# Patient Record
Sex: FEMALE | Race: WHITE | NOT HISPANIC OR LATINO | Employment: OTHER | URBAN - NONMETROPOLITAN AREA
[De-identification: names, ages, dates, MRNs, and addresses within clinical notes are randomized per-mention and may not be internally consistent; named-entity substitution may affect disease eponyms.]

---

## 2017-10-23 ENCOUNTER — HISTORY (OUTPATIENT)
Dept: FAMILY MEDICINE | Facility: OTHER | Age: 77
End: 2017-10-23

## 2017-10-23 ENCOUNTER — OFFICE VISIT - GICH (OUTPATIENT)
Dept: FAMILY MEDICINE | Facility: OTHER | Age: 77
End: 2017-10-23

## 2017-10-23 DIAGNOSIS — E78.5 HYPERLIPIDEMIA: ICD-10-CM

## 2017-10-23 DIAGNOSIS — L30.9 DERMATITIS: ICD-10-CM

## 2017-10-23 DIAGNOSIS — I10 ESSENTIAL (PRIMARY) HYPERTENSION: ICD-10-CM

## 2017-10-23 DIAGNOSIS — R73.9 HYPERGLYCEMIA: ICD-10-CM

## 2017-10-23 DIAGNOSIS — Z23 ENCOUNTER FOR IMMUNIZATION: ICD-10-CM

## 2017-10-23 LAB
A/G RATIO - HISTORICAL: 1.3 (ref 1–2)
ALB RAND URINE - HISTORICAL: 8 MG/L
ALBUMIN SERPL-MCNC: 4.2 G/DL (ref 3.5–5.7)
ALP SERPL-CCNC: 55 IU/L (ref 34–104)
ALT (SGPT) - HISTORICAL: 16 IU/L (ref 7–52)
ANION GAP - HISTORICAL: 10 (ref 5–18)
AST SERPL-CCNC: 17 IU/L (ref 13–39)
BILIRUB SERPL-MCNC: 0.6 MG/DL (ref 0.3–1)
BUN SERPL-MCNC: 15 MG/DL (ref 7–25)
BUN/CREAT RATIO - HISTORICAL: 16
CALCIUM SERPL-MCNC: 9.8 MG/DL (ref 8.6–10.3)
CHLORIDE SERPLBLD-SCNC: 102 MMOL/L (ref 98–107)
CHOL/HDL RATIO - HISTORICAL: 3.13
CHOLESTEROL TOTAL: 166 MG/DL
CO2 SERPL-SCNC: 26 MMOL/L (ref 21–31)
CREAT SERPL-MCNC: 0.96 MG/DL (ref 0.7–1.3)
CREATININE, URINE - HISTORICAL: 1.21 G/L
ESTIMATED AVERAGE GLUCOSE: 126 MG/DL
GFR IF NOT AFRICAN AMERICAN - HISTORICAL: 57 ML/MIN/1.73M2
GLOBULIN - HISTORICAL: 3.3 G/DL (ref 2–3.7)
GLUCOSE SERPL-MCNC: 127 MG/DL (ref 70–105)
HDLC SERPL-MCNC: 53 MG/DL (ref 23–92)
HEMOGLOBIN A1C MONITORING (POCT) - HISTORICAL: 6 % (ref 4–6.2)
LDLC SERPL CALC-MCNC: 77 MG/DL
MICROALBUMIN, RAND UR - HISTORICAL: 6.6 MG/G CREAT
NON-HDL CHOLESTEROL - HISTORICAL: 113 MG/DL
POTASSIUM SERPL-SCNC: 3.9 MMOL/L (ref 3.5–5.1)
PROT SERPL-MCNC: 7.5 G/DL (ref 6.4–8.9)
PROVIDER ORDERDED STATUS - HISTORICAL: ABNORMAL
SODIUM SERPL-SCNC: 138 MMOL/L (ref 133–143)
TRIGL SERPL-MCNC: 180 MG/DL

## 2017-12-28 NOTE — PROGRESS NOTES
Patient Information     Patient Name MRN Sex     Ankit Lew [id number] Female 1940      Progress Notes signed by Yelena Cotton MD at 10/23/2017  4:36 PM      Author:  Yelena Cotton MD Service:  (none) Author Type:  Physician     Filed:  10/23/2017  4:36 PM Encounter Date:  10/23/2017 Status:  Signed     :  Yelena Cotton MD (Physician)            2017      ANKIT LEW  [address] [place] [address]      RE: ANKIT LEW  MR#: [id number]  : 1940        Dear Ms. Gibbons:    The results of your lab work is enclosed. As you can see, it is all essentially normal.  Your glucose or blood sugar does run a bit high; however, the A1c test for diabetes is normal, so your average has been just fine and there is no need to do any further testing at this time.  We should just plan to repeat lab work again in a year.    Your cholesterol and your blood profile otherwise looked fine and again should be just done again in a year.  Medications can remain the same.    Sincerely yours,      YELENA COTTON MD    WR/nn  D:10/23/2017 15:44:18  T:10/23/2017 16:09:46  VJID: [id number]  TJID: [id number]    cc:  Enclosure(s):10/23/2017, lipid panel, metabolic panel, A1c

## 2017-12-28 NOTE — PROGRESS NOTES
Patient Information     Patient Name MRN Oanh Azul [id number] Female 1940      Progress Notes by Dickson Almanzar MD at 10/23/2017  9:15 AM     Author:  Dickson Almanzar MD Service:  (none) Author Type:  Physician     Filed:  10/23/2017 11:33 AM Encounter Date:  10/23/2017 Status:  Signed     :  Dickson Almanzar MD (Physician)            HPI-Comes in today for comprehensive evaluation.  She's been feeling fine. She needs medications refilled. She said she's basically well and has no problems or concerns.    ROS:  See HPI.  Weight is stable. No fever, chills, or night sweats. Eyes, ENT, cardiopulmonary, GI, , rheumatologic, hematologic, skin, lymph, neurologic, psychiatric and endocrine are all negative.    Past Medical History:     Diagnosis  Date     CATARACTS      inactive icd-9 diagnosis auto replaced with icd-10, display name retained//mporz      History of delivery     Child birth x 3      Hyperlipidemia      Hypertension      Osteoarthritis      Past Surgical History:      Procedure  Laterality Date     Bilateral tubal ligation      Bilateral tubal ligation       COLONOSCOPY SCREENING  10/24/2013    diverticulosis-no further colonoscopy rec d/t age       Family History       Problem   Relation Age of Onset     Cancer  Mother       lung cancer       Stroke  Father 70     Cancer-breast  No Family History      Social History     Substance Use Topics       Smoking status: Never Smoker     Smokeless tobacco: Never Used     Alcohol use Yes     No Known Allergies    Current Outpatient Prescriptions:      aspirin 81 mg tablet, Take 81 mg by mouth once daily with a meal., Disp: , Rfl:      hydroCHLOROthiazide (HCTZ) 25 mg tablet, Take 1 tablet by mouth once daily., Disp: 90 tablet, Rfl: 4     MULTIVITAMINS WITH IRON (MULTI-VITAMIN WITH IRON ORAL), Take 1 Tab by mouth., Disp: , Rfl:      simvastatin (ZOCOR) 10 mg tablet, Take 1 tablet by mouth at bedtime., Disp: 90 tablet,  Rfl: 4     triamcinolone (ARISTOCORT) 0.1 % ointment, Apply  topically to affected area(s) 3 times daily., Disp: 80 g, Rfl: 3  Medications have been reviewed by me and are current to the best of my knowledge and ability.      EXAM:she is a pleasant healthy-appearing 77 y.o. female in no apparent distress.  She answers questions appropriately and appears well-kempt.  HEENT exam is within normal limits.  .  Neck is supple with good carotid pulses.  No bruits are heard.  No thyromegaly or adenopathy.  Lungs are clear with good air movement.  No rales, rhonchi, or wheezes are heard--  Cardiac exam reveals a regular rhythm with no murmur or gallop appreciated.  Peripheral pulses are equal and strong  Abdomen-soft and nontender with no hepatosplenomegaly or masses.  Bowel sounds are normal and no bruits are heard  Back is straight with no scoliosis or CVA tenderness.      Extremities no edema. There are a few minor varicose veins. Good pulses.  Neurologic exam is intact and nonfocal  Skin is free of significant lesions.  No rashes are seen  Psychiatric: Alert and oriented with normal mood and affect    Lab-pending  Imaging-mammogram was normal last year. Patient prefers to be on a 2 year schedule so this will be due next year.    Assessment-preventive healthcare-immunizations are up-to-date.flu vaccine was given today.    History of eczema-refilled triamcinolone    Hypertension with good control    History of hyperglycemia with slightly elevated A1c-labs pending    Hyperlipidemia-labs pending    Plan-meds refilled. Labs done, she'll be notified of the result. She is leaving in 2 days for [place] for the winter. We'll follow-up again in the spring pending lab results otherwise at yearly intervals.    Dickson Almanzar MD ....................  10/23/2017   11:30 AM

## 2017-12-30 NOTE — NURSING NOTE
Patient Information     Patient Name MRN Sex Oanh Robison [id number] Female 1940      Nursing Note by Nimisha Martinez at 10/23/2017  9:15 AM     Author:  Nimisha Martinez Service:  (none) Author Type:  (none)     Filed:  10/23/2017  9:31 AM Encounter Date:  10/23/2017 Status:  Signed     :  Nimisha Martinez            Patient presents to clinic for medication management  Nimisha Iyer ....................  10/23/2017   9:08 AM

## 2018-01-27 VITALS
SYSTOLIC BLOOD PRESSURE: 118 MMHG | WEIGHT: 154.8 LBS | BODY MASS INDEX: 26.57 KG/M2 | DIASTOLIC BLOOD PRESSURE: 66 MMHG | HEART RATE: 64 BPM

## 2018-02-01 ENCOUNTER — DOCUMENTATION ONLY (OUTPATIENT)
Dept: FAMILY MEDICINE | Facility: OTHER | Age: 78
End: 2018-02-01

## 2018-02-01 PROBLEM — I10 HYPERTENSION: Status: ACTIVE | Noted: 2018-02-01

## 2018-02-01 PROBLEM — E78.5 HYPERLIPIDEMIA: Status: ACTIVE | Noted: 2018-02-01

## 2018-02-01 RX ORDER — TRIAMCINOLONE ACETONIDE 1 MG/G
OINTMENT TOPICAL
COMMUNITY
Start: 2017-10-23 | End: 2020-10-06

## 2018-02-01 RX ORDER — HYDROCHLOROTHIAZIDE 25 MG/1
25 TABLET ORAL DAILY
COMMUNITY
Start: 2017-10-23 | End: 2018-10-11

## 2018-02-01 RX ORDER — SIMVASTATIN 10 MG
10 TABLET ORAL AT BEDTIME
COMMUNITY
Start: 2017-10-23 | End: 2018-10-11

## 2018-09-10 ENCOUNTER — TELEPHONE (OUTPATIENT)
Dept: FAMILY MEDICINE | Facility: OTHER | Age: 78
End: 2018-09-10

## 2018-09-10 NOTE — TELEPHONE ENCOUNTER
WLR- Patient requesting an appointment for their annual med renewal prior to 10/25/18 because they are leaving for the winter. Patient is aware that provider is out of clinic. Please call.

## 2018-10-11 ENCOUNTER — OFFICE VISIT (OUTPATIENT)
Dept: FAMILY MEDICINE | Facility: OTHER | Age: 78
End: 2018-10-11
Attending: FAMILY MEDICINE
Payer: MEDICARE

## 2018-10-11 VITALS
TEMPERATURE: 97.3 F | DIASTOLIC BLOOD PRESSURE: 78 MMHG | SYSTOLIC BLOOD PRESSURE: 138 MMHG | BODY MASS INDEX: 26.57 KG/M2 | RESPIRATION RATE: 16 BRPM | HEART RATE: 82 BPM | WEIGHT: 154.8 LBS

## 2018-10-11 DIAGNOSIS — Z23 FLU VACCINE NEED: ICD-10-CM

## 2018-10-11 DIAGNOSIS — I10 ESSENTIAL HYPERTENSION: Primary | ICD-10-CM

## 2018-10-11 DIAGNOSIS — R73.9 HYPERGLYCEMIA: ICD-10-CM

## 2018-10-11 DIAGNOSIS — E78.5 HYPERLIPIDEMIA, UNSPECIFIED HYPERLIPIDEMIA TYPE: ICD-10-CM

## 2018-10-11 LAB
ALBUMIN SERPL-MCNC: 4.5 G/DL (ref 3.5–5.7)
ALBUMIN UR-MCNC: NEGATIVE MG/DL
ALP SERPL-CCNC: 56 U/L (ref 34–104)
ALT SERPL W P-5'-P-CCNC: 19 U/L (ref 7–52)
ANION GAP SERPL CALCULATED.3IONS-SCNC: 7 MMOL/L (ref 3–14)
APPEARANCE UR: CLEAR
AST SERPL W P-5'-P-CCNC: 17 U/L (ref 13–39)
BACTERIA #/AREA URNS HPF: ABNORMAL /HPF
BASOPHILS # BLD AUTO: 0 10E9/L (ref 0–0.2)
BASOPHILS NFR BLD AUTO: 0.6 %
BILIRUB SERPL-MCNC: 0.7 MG/DL (ref 0.3–1)
BILIRUB UR QL STRIP: NEGATIVE
BUN SERPL-MCNC: 16 MG/DL (ref 7–25)
CALCIUM SERPL-MCNC: 10.3 MG/DL (ref 8.6–10.3)
CHLORIDE SERPL-SCNC: 100 MMOL/L (ref 98–107)
CHOLEST SERPL-MCNC: 196 MG/DL
CO2 SERPL-SCNC: 31 MMOL/L (ref 21–31)
COLOR UR AUTO: YELLOW
CREAT SERPL-MCNC: 0.94 MG/DL (ref 0.6–1.2)
DIFFERENTIAL METHOD BLD: NORMAL
EOSINOPHIL # BLD AUTO: 0.1 10E9/L (ref 0–0.7)
EOSINOPHIL NFR BLD AUTO: 1.6 %
ERYTHROCYTE [DISTWIDTH] IN BLOOD BY AUTOMATED COUNT: 11.9 % (ref 10–15)
GFR SERPL CREATININE-BSD FRML MDRD: 58 ML/MIN/1.7M2
GLUCOSE SERPL-MCNC: 132 MG/DL (ref 70–105)
GLUCOSE UR STRIP-MCNC: NEGATIVE MG/DL
HBA1C MFR BLD: 6.7 % (ref 4–6)
HCT VFR BLD AUTO: 40.6 % (ref 35–47)
HDLC SERPL-MCNC: 53 MG/DL (ref 23–92)
HGB BLD-MCNC: 14.3 G/DL (ref 11.7–15.7)
HGB UR QL STRIP: NEGATIVE
IMM GRANULOCYTES # BLD: 0 10E9/L (ref 0–0.4)
IMM GRANULOCYTES NFR BLD: 0.5 %
KETONES UR STRIP-MCNC: NEGATIVE MG/DL
LDLC SERPL CALC-MCNC: 99 MG/DL
LEUKOCYTE ESTERASE UR QL STRIP: ABNORMAL
LYMPHOCYTES # BLD AUTO: 2 10E9/L (ref 0.8–5.3)
LYMPHOCYTES NFR BLD AUTO: 31.9 %
MCH RBC QN AUTO: 31.9 PG (ref 26.5–33)
MCHC RBC AUTO-ENTMCNC: 35.2 G/DL (ref 31.5–36.5)
MCV RBC AUTO: 91 FL (ref 78–100)
MONOCYTES # BLD AUTO: 0.6 10E9/L (ref 0–1.3)
MONOCYTES NFR BLD AUTO: 9 %
NEUTROPHILS # BLD AUTO: 3.6 10E9/L (ref 1.6–8.3)
NEUTROPHILS NFR BLD AUTO: 56.4 %
NITRATE UR QL: NEGATIVE
NON-SQ EPI CELLS #/AREA URNS LPF: ABNORMAL /LPF
NONHDLC SERPL-MCNC: 143 MG/DL
PH UR STRIP: 7.5 PH (ref 5–9)
PLATELET # BLD AUTO: 239 10E9/L (ref 150–450)
POTASSIUM SERPL-SCNC: 4.3 MMOL/L (ref 3.5–5.1)
PROT SERPL-MCNC: 7.3 G/DL (ref 6.4–8.9)
RBC # BLD AUTO: 4.48 10E12/L (ref 3.8–5.2)
RBC #/AREA URNS AUTO: ABNORMAL /HPF
SODIUM SERPL-SCNC: 138 MMOL/L (ref 134–144)
SOURCE: ABNORMAL
SP GR UR STRIP: 1.01 (ref 1–1.03)
TRIGL SERPL-MCNC: 222 MG/DL
UROBILINOGEN UR STRIP-ACNC: 0.2 EU/DL (ref 0.2–1)
WBC # BLD AUTO: 6.3 10E9/L (ref 4–11)
WBC #/AREA URNS AUTO: ABNORMAL /HPF

## 2018-10-11 PROCEDURE — 80053 COMPREHEN METABOLIC PANEL: CPT | Performed by: FAMILY MEDICINE

## 2018-10-11 PROCEDURE — 36415 COLL VENOUS BLD VENIPUNCTURE: CPT | Performed by: FAMILY MEDICINE

## 2018-10-11 PROCEDURE — 83036 HEMOGLOBIN GLYCOSYLATED A1C: CPT | Performed by: FAMILY MEDICINE

## 2018-10-11 PROCEDURE — G0463 HOSPITAL OUTPT CLINIC VISIT: HCPCS

## 2018-10-11 PROCEDURE — G0008 ADMIN INFLUENZA VIRUS VAC: HCPCS

## 2018-10-11 PROCEDURE — 99213 OFFICE O/P EST LOW 20 MIN: CPT | Performed by: FAMILY MEDICINE

## 2018-10-11 PROCEDURE — G0463 HOSPITAL OUTPT CLINIC VISIT: HCPCS | Mod: 25

## 2018-10-11 PROCEDURE — 90662 IIV NO PRSV INCREASED AG IM: CPT | Performed by: FAMILY MEDICINE

## 2018-10-11 PROCEDURE — 81001 URINALYSIS AUTO W/SCOPE: CPT | Performed by: FAMILY MEDICINE

## 2018-10-11 PROCEDURE — 85025 COMPLETE CBC W/AUTO DIFF WBC: CPT | Performed by: FAMILY MEDICINE

## 2018-10-11 PROCEDURE — 80061 LIPID PANEL: CPT | Performed by: FAMILY MEDICINE

## 2018-10-11 PROCEDURE — 90471 IMMUNIZATION ADMIN: CPT

## 2018-10-11 RX ORDER — HYDROCHLOROTHIAZIDE 25 MG/1
25 TABLET ORAL DAILY
Qty: 90 TABLET | Refills: 3 | Status: SHIPPED | OUTPATIENT
Start: 2018-10-11 | End: 2019-10-07

## 2018-10-11 RX ORDER — SIMVASTATIN 10 MG
10 TABLET ORAL AT BEDTIME
Qty: 90 TABLET | Refills: 3 | Status: SHIPPED | OUTPATIENT
Start: 2018-10-11 | End: 2019-10-07

## 2018-10-11 ASSESSMENT — PAIN SCALES - GENERAL: PAINLEVEL: NO PAIN (0)

## 2018-10-11 NOTE — PROGRESS NOTES

## 2018-10-11 NOTE — NURSING NOTE
"Chief Complaint   Patient presents with     Recheck Medication       Initial /80 (BP Location: Right arm, Patient Position: Chair, Cuff Size: Adult Large)  Pulse 82  Temp 97.3  F (36.3  C) (Tympanic)  Resp 16  Wt 154 lb 12.8 oz (70.2 kg)  BMI 26.57 kg/m2 Estimated body mass index is 26.57 kg/(m^2) as calculated from the following:    Height as of 10/20/16: 5' 4\" (1.626 m).    Weight as of this encounter: 154 lb 12.8 oz (70.2 kg).  Medication Reconciliation: complete    Cinda Michele LPN  "

## 2018-10-11 NOTE — LETTER
October 11, 2018      Oanh Bowen  [address] [facility] [address]        Dear ,    Your lab work is enclosed.  As you can see, your blood profile is normal with exception of the elevated blood sugar or glucose at 132.  The A1c test for diabetes, at 6.7%, indicates that your blood sugar for the last several months is been around 140.  Your cholesterol looks fine and triglycerides are just minimally elevated at 222.    Your medication can remain the same.  I would encourage you to work hard on diet and exercise in an attempt to keep the blood sugar down.  I would suggest that when you return from [place] in the spring, you have another A1c test done, and perhaps meet with the diabetic educator.  This blood sugar is a bit higher than it has been in the past.  In the meantime, diet and exercise should be adequate.    Resulted Orders   CBC with platelets differential   Result Value Ref Range    WBC 6.3 4.0 - 11.0 10e9/L    RBC Count 4.48 3.8 - 5.2 10e12/L    Hemoglobin 14.3 11.7 - 15.7 g/dL    Hematocrit 40.6 35.0 - 47.0 %    MCV 91 78 - 100 fl    MCH 31.9 26.5 - 33.0 pg    MCHC 35.2 31.5 - 36.5 g/dL    RDW 11.9 10.0 - 15.0 %    Platelet Count 239 150 - 450 10e9/L    Diff Method Automated Method     % Neutrophils 56.4 %    % Lymphocytes 31.9 %    % Monocytes 9.0 %    % Eosinophils 1.6 %    % Basophils 0.6 %    % Immature Granulocytes 0.5 %    Absolute Neutrophil 3.6 1.6 - 8.3 10e9/L    Absolute Lymphocytes 2.0 0.8 - 5.3 10e9/L    Absolute Monocytes 0.6 0.0 - 1.3 10e9/L    Absolute Eosinophils 0.1 0.0 - 0.7 10e9/L    Absolute Basophils 0.0 0.0 - 0.2 10e9/L    Abs Immature Granulocytes 0.0 0 - 0.4 10e9/L   Comprehensive metabolic panel   Result Value Ref Range    Sodium 138 134 - 144 mmol/L    Potassium 4.3 3.5 - 5.1 mmol/L    Chloride 100 98 - 107 mmol/L    Carbon Dioxide 31 21 - 31 mmol/L    Anion Gap 7 3 - 14 mmol/L    Glucose 132 (H) 70 - 105 mg/dL    Urea Nitrogen 16 7 - 25 mg/dL     Creatinine 0.94 0.60 - 1.20 mg/dL    GFR Estimate 58 (L) >60 mL/min/1.7m2    GFR Estimate If Black 70 >60 mL/min/1.7m2    Calcium 10.3 8.6 - 10.3 mg/dL    Bilirubin Total 0.7 0.3 - 1.0 mg/dL    Albumin 4.5 3.5 - 5.7 g/dL    Protein Total 7.3 6.4 - 8.9 g/dL    Alkaline Phosphatase 56 34 - 104 U/L    ALT 19 7 - 52 U/L    AST 17 13 - 39 U/L   Hemoglobin A1c   Result Value Ref Range    Hemoglobin A1C 6.7 (H) 4.0 - 6.0 %   Lipid Profile   Result Value Ref Range    Cholesterol 196 <200 mg/dL    Triglycerides 222 (H) <150 mg/dL      Comment:      Borderline high:  150-199 mg/dl  High:             200-499 mg/dl  Very high:       >499 mg/dl      HDL Cholesterol 53 23 - 92 mg/dL    LDL Cholesterol Calculated 99 <100 mg/dL      Comment:      Desirable:       <100 mg/dl    Non HDL Cholesterol 143 (H) <130 mg/dL      Comment:      Above Desirable:  130-159 mg/dl  Borderline high:  160-189 mg/dl  High:             190-219 mg/dl  Very high:       >219 mg/dl     *UA reflex to Microscopic   Result Value Ref Range    Color Urine Yellow     Appearance Urine Clear     Glucose Urine Negative NEG^Negative mg/dL    Bilirubin Urine Negative NEG^Negative    Ketones Urine Negative NEG^Negative mg/dL    Specific Gravity Urine 1.015 1.000 - 1.030    Blood Urine Negative NEG^Negative    pH Urine 7.5 5.0 - 9.0 pH    Protein Albumin Urine Negative NEG^Negative mg/dL    Urobilinogen Urine 0.2 0.2 - 1.0 EU/dL    Nitrite Urine Negative NEG^Negative    Leukocyte Esterase Urine Moderate (A) NEG^Negative    Source Midstream Urine    Urine Microscopic   Result Value Ref Range    WBC Urine 0 - 5 OTO5^0 - 5 /HPF    RBC Urine O - 2 OTO2^O - 2 /HPF    Squamous Epithelial /LPF Urine Few FEW^Few /LPF    Bacteria Urine Few (A) NEG^Negative /HPF       If you have any questions or concerns, please feel free to contact me before you leave for [place].      Sincerely,        YELENA COTTON MD

## 2018-10-11 NOTE — PROGRESS NOTES
SUBJECTIVE:  78 year old female who presents for medication refill.  She states she is feeling just fine.  She has no new problems or concerns.  She and her  are leaving for [place] in 2 weeks.    Medications all remain the same.  She has had no cardiopulmonary symptoms neurologic symptoms or any other problems.  She was going to schedule a mammogram but decided to wait until next year.  She has had no lumps or bumps.    Additional Review of Systems: See HPI: No new symptoms otherwise    Past Medical History:   Diagnosis Date     Cataract     inactive icd-9 diagnosis auto replaced with icd-10, display name retained//mporz     Essential (primary) hypertension     No Comments Provided     Hyperlipidemia     No Comments Provided     Osteoarthritis     No Comments Provided     Personal history of other diseases of the female genital tract     Child birth x 3        Current Outpatient Prescriptions   Medication Sig Dispense Refill     aspirin 81 MG tablet Take 81 mg by mouth daily With a meal       hydrochlorothiazide (HYDRODIURIL) 25 MG tablet Take 1 tablet (25 mg) by mouth daily 90 tablet 3     Multiple Vitamins-Iron (MULTIVITAMIN/IRON PO)        simvastatin (ZOCOR) 10 MG tablet Take 1 tablet (10 mg) by mouth At Bedtime 90 tablet 3     triamcinolone (KENALOG) 0.1 % ointment        [DISCONTINUED] hydrochlorothiazide (HYDRODIURIL) 25 MG tablet Take 25 mg by mouth daily       [DISCONTINUED] simvastatin (ZOCOR) 10 MG tablet Take 10 mg by mouth At Bedtime         Allergies as of 10/11/2018     (No Known Allergies)        OBJECTIVE:  /80 (BP Location: Right arm, Patient Position: Chair, Cuff Size: Adult Large)  Pulse 82  Temp 97.3  F (36.3  C) (Tympanic)  Resp 16  Wt 154 lb 12.8 oz (70.2 kg)  BMI 26.57 kg/m2  EXAM: {EXAM -  General: He is a pleasant elderly female, cooperative, answers questions appropriately and is in no apparent distress  ENT/ neck: Neck is supple with good carotid pulses, no bruits are  heard  Chest/cardiac: Lungs are clear.  Cardiac exam is normal.  Repeat blood pressure was 138/78.  Abdomen/: Soft and nontender  Extremities: No edema  Skin: No rashes  Neuro/psych: Neurologically intact    Labs/imaging: Labs are pending      ASSESSMENT/PLAN:  Hypertension with adequate control.  Medication will remain the same.  The same history of hyperlipidemia.  We also checked her A1c because of a history of hyperglycemia.  Her last A1c was normal.  Flu vaccine was recommended and given.  She plans on doing a mammogram next year.  YELENA COTTON MD on 10/11/2018 at 12:22 PM

## 2018-10-11 NOTE — MR AVS SNAPSHOT
"              After Visit Summary   10/11/2018    Oanh Bowen    MRN: [id number]           Patient Information     Date Of Birth          1940        Visit Information        Provider Department      10/11/2018 11:00 AM Dickson Almanzar MD [facility]        Today's Diagnoses     Essential hypertension    -  1    Hyperlipidemia, unspecified hyperlipidemia type        Flu vaccine need        Hyperglycemia           Follow-ups after your visit        Who to contact     If you have questions or need follow up information about today's clinic visit or your schedule please contact [facility] directly at [phone].  Normal or non-critical lab and imaging results will be communicated to you by [employer], letter or phone within 4 business days after the clinic has received the results. If you do not hear from us within 7 days, please contact the clinic through [employer] or phone. If you have a critical or abnormal lab result, we will notify you by phone as soon as possible.  Submit refill requests through [employer] or call your pharmacy and they will forward the refill request to us. Please allow 3 business days for your refill to be completed.          Additional Information About Your Visit        MyChart Information     [employer] lets you send messages to your doctor, view your test results, renew your prescriptions, schedule appointments and more. To sign up, go to [email or web address] . Click on \"Log in\" on the left side of the screen, which will take you to the Welcome page. Then click on \"Sign up Now\" on the right side of the page.     You will be asked to enter the access code listed below, as well as some personal information. Please follow the directions to create your username and password.     Your access code is: [email or web address]  Expires: 2019 12:23 PM     Your access code will  in 90 days. If you need help or a new code, please call your " [facility] or [phone].        Care EveryWhere ID     This is your Care EveryWhere ID. This could be used by other organizations to access your [facility] medical records  [id number]        Your Vitals Were     Pulse Temperature Respirations BMI (Body Mass Index)          82 97.3  F (36.3  C) (Tympanic) 16 26.57 kg/m2         Blood Pressure from Last 3 Encounters:   10/11/18 138/78   10/23/17 118/66   10/20/16 108/70    Weight from Last 3 Encounters:   10/11/18 154 lb 12.8 oz (70.2 kg)   10/23/17 154 lb 12.8 oz (70.2 kg)   10/20/16 157 lb 6.4 oz (71.4 kg)              We Performed the Following     *UA reflex to Microscopic     CBC with platelets differential     Comprehensive metabolic panel     GH IMM-  FLU VACCINE, INCREASED ANTIGEN, PRESV FREE     Hemoglobin A1c     Lipid Profile     Urine Microscopic          Where to get your medicines      These medications were sent to [facility] [address] [place] [id number]     Phone:  [phone]     hydrochlorothiazide 25 MG tablet    simvastatin 10 MG tablet          Primary Care Provider Office Phone # Fax #    Dickson Almanzar [phone]       [address] [facility] [address]        Equal Access to Services     TRE CHINO AH: Hadii ne ku hadasho Soomaali, waaxda luqadaha, qaybta kaalmada adeegjeannine, shreyas osman. So [facility] [phone].    ATENCIÓN: Si habla español, tiene a ramirez disposición servicios gratuitos de asistencia lingüística. Llame al [phone].    We comply with applicable federal civil rights laws and [place] laws. We do not discriminate on the basis of race, color, national origin, age, disability, sex, sexual orientation, or gender identity.            Thank you!     Thank you for choosing [facility] AND [facility]  for your care. Our goal is always to provide you with excellent care. Hearing back  from our patients is one way we can continue to improve our services. Please take a few minutes to complete the written survey that you may receive in the mail after your visit with us. Thank you!             Your Updated Medication List - Protect others around you: Learn how to safely use, store and throw away your medicines at www.disposemymeds.org.          This list is accurate as of 10/11/18 12:23 PM.  Always use your most recent med list.                   Brand Name Dispense Instructions for use Diagnosis    aspirin 81 MG tablet      Take 81 mg by mouth daily With a meal        hydrochlorothiazide 25 MG tablet    HYDRODIURIL    90 tablet    Take 1 tablet (25 mg) by mouth daily    Essential hypertension       MULTIVITAMIN/IRON PO           simvastatin 10 MG tablet    ZOCOR    90 tablet    Take 1 tablet (10 mg) by mouth At Bedtime    Hyperlipidemia, unspecified hyperlipidemia type       triamcinolone 0.1 % ointment    KENALOG

## 2019-10-07 ENCOUNTER — OFFICE VISIT (OUTPATIENT)
Dept: FAMILY MEDICINE | Facility: OTHER | Age: 79
End: 2019-10-07
Attending: FAMILY MEDICINE
Payer: MEDICARE

## 2019-10-07 VITALS
RESPIRATION RATE: 20 BRPM | WEIGHT: 147.8 LBS | TEMPERATURE: 97.9 F | SYSTOLIC BLOOD PRESSURE: 120 MMHG | BODY MASS INDEX: 25.23 KG/M2 | OXYGEN SATURATION: 94 % | DIASTOLIC BLOOD PRESSURE: 80 MMHG | HEIGHT: 64 IN | HEART RATE: 97 BPM

## 2019-10-07 DIAGNOSIS — Z23 NEEDS FLU SHOT: ICD-10-CM

## 2019-10-07 DIAGNOSIS — Z23 NEED FOR SHINGLES VACCINE: ICD-10-CM

## 2019-10-07 DIAGNOSIS — Z23 NEED FOR TDAP VACCINATION: ICD-10-CM

## 2019-10-07 DIAGNOSIS — Z00.00 HEALTH CARE MAINTENANCE: ICD-10-CM

## 2019-10-07 DIAGNOSIS — I10 ESSENTIAL HYPERTENSION: Primary | ICD-10-CM

## 2019-10-07 DIAGNOSIS — M79.605 PAIN OF LEFT LOWER EXTREMITY: ICD-10-CM

## 2019-10-07 DIAGNOSIS — R73.9 HYPERGLYCEMIA: ICD-10-CM

## 2019-10-07 DIAGNOSIS — E78.5 HYPERLIPIDEMIA, UNSPECIFIED HYPERLIPIDEMIA TYPE: ICD-10-CM

## 2019-10-07 LAB
ALBUMIN SERPL-MCNC: 4.4 G/DL (ref 3.5–5.7)
ALP SERPL-CCNC: 62 U/L (ref 34–104)
ALT SERPL W P-5'-P-CCNC: 14 U/L (ref 7–52)
ANION GAP SERPL CALCULATED.3IONS-SCNC: 9 MMOL/L (ref 3–14)
AST SERPL W P-5'-P-CCNC: 14 U/L (ref 13–39)
BILIRUB SERPL-MCNC: 0.6 MG/DL (ref 0.3–1)
BUN SERPL-MCNC: 14 MG/DL (ref 7–25)
CALCIUM SERPL-MCNC: 10 MG/DL (ref 8.6–10.3)
CHLORIDE SERPL-SCNC: 99 MMOL/L (ref 98–107)
CHOLEST SERPL-MCNC: 187 MG/DL
CO2 SERPL-SCNC: 29 MMOL/L (ref 21–31)
CREAT SERPL-MCNC: 0.92 MG/DL (ref 0.6–1.2)
GFR SERPL CREATININE-BSD FRML MDRD: 59 ML/MIN/{1.73_M2}
GLUCOSE SERPL-MCNC: 133 MG/DL (ref 70–105)
HDLC SERPL-MCNC: 62 MG/DL (ref 23–92)
LDLC SERPL CALC-MCNC: 89 MG/DL
NONHDLC SERPL-MCNC: 125 MG/DL
POTASSIUM SERPL-SCNC: 3.6 MMOL/L (ref 3.5–5.1)
PROT SERPL-MCNC: 7.8 G/DL (ref 6.4–8.9)
SODIUM SERPL-SCNC: 137 MMOL/L (ref 134–144)
TRIGL SERPL-MCNC: 182 MG/DL

## 2019-10-07 PROCEDURE — G0463 HOSPITAL OUTPT CLINIC VISIT: HCPCS | Mod: 25

## 2019-10-07 PROCEDURE — 90662 IIV NO PRSV INCREASED AG IM: CPT

## 2019-10-07 PROCEDURE — 36415 COLL VENOUS BLD VENIPUNCTURE: CPT | Mod: ZL | Performed by: FAMILY MEDICINE

## 2019-10-07 PROCEDURE — G0463 HOSPITAL OUTPT CLINIC VISIT: HCPCS

## 2019-10-07 PROCEDURE — 80053 COMPREHEN METABOLIC PANEL: CPT | Mod: ZL | Performed by: FAMILY MEDICINE

## 2019-10-07 PROCEDURE — 80061 LIPID PANEL: CPT | Mod: ZL | Performed by: FAMILY MEDICINE

## 2019-10-07 PROCEDURE — 99214 OFFICE O/P EST MOD 30 MIN: CPT | Performed by: FAMILY MEDICINE

## 2019-10-07 PROCEDURE — G0008 ADMIN INFLUENZA VIRUS VAC: HCPCS

## 2019-10-07 RX ORDER — HYDROCHLOROTHIAZIDE 25 MG/1
25 TABLET ORAL DAILY
Qty: 90 TABLET | Refills: 3 | Status: SHIPPED | OUTPATIENT
Start: 2019-10-07 | End: 2020-10-06

## 2019-10-07 RX ORDER — SIMVASTATIN 10 MG
10 TABLET ORAL AT BEDTIME
Qty: 90 TABLET | Refills: 3 | Status: SHIPPED | OUTPATIENT
Start: 2019-10-07 | End: 2020-10-06

## 2019-10-07 ASSESSMENT — ENCOUNTER SYMPTOMS
FEVER: 0
COUGH: 0
CHILLS: 0
NERVOUS/ANXIOUS: 0
MYALGIAS: 1

## 2019-10-07 ASSESSMENT — MIFFLIN-ST. JEOR: SCORE: 1127.48

## 2019-10-07 ASSESSMENT — PAIN SCALES - GENERAL: PAINLEVEL: NO PAIN (0)

## 2019-10-07 NOTE — PROGRESS NOTES
"  SUBJECTIVE:   Nursing Notes:   Lulu Moody LPN  10/7/2019 11:08 AM  Sign at exiting of workspace  Chief Complaint   Patient presents with     Recheck Medication     medication management        Initial /80 (BP Location: Right arm, Patient Position: Sitting, Cuff Size: Adult Regular)   Pulse 97   Temp 97.9  F (36.6  C) (Tympanic)   Resp 20   Ht 1.613 m (5' 3.5\")   Wt 67 kg (147 lb 12.8 oz)   SpO2 94%   BMI 25.77 kg/m    Estimated body mass index is 25.77 kg/m  as calculated from the following:    Height as of this encounter: 1.613 m (5' 3.5\").    Weight as of this encounter: 67 kg (147 lb 12.8 oz).  Medication Reconciliation: complete    Lulu Moody LPN    Oanh Bowen is a 79 year old female who presents to clinic today for refill of her medications and follow up of chronic health issues.    Left leg has been bothering her.  Occasionally has cramps in it, comes and goes.  She doesn't feel that the pain is in her hip joint.      HPI    I personally reviewed medications/allergies/history listed below:    Patient Active Problem List    Diagnosis Date Noted     Hyperlipidemia, unspecified hyperlipidemia type 10/11/2018     Priority: Medium     Essential hypertension 10/11/2018     Priority: Medium     Hyperglycemia 10/11/2018     Priority: Medium     Past Medical History:   Diagnosis Date     Cataract     inactive icd-9 diagnosis auto replaced with icd-10, display name retained//mporz     Essential (primary) hypertension     No Comments Provided     Hyperlipidemia     No Comments Provided     Osteoarthritis     No Comments Provided     Personal history of other diseases of the female genital tract     Child birth x 3      Past Surgical History:   Procedure Laterality Date     COLONOSCOPY      10/24/2013,diverticulosis-no further colonoscopy rec d/t age     OTHER SURGICAL HISTORY      439606,OTHER,Bilateral tubal ligation     Family History   Problem Relation Age of Onset     Cancer " "Mother         Cancer, lung cancer     Other - See Comments Father 70        Stroke     Breast Cancer No family hx of         Cancer-breast     Social History     Tobacco Use     Smoking status: Never Smoker     Smokeless tobacco: Never Used   Substance Use Topics     Alcohol use: Yes     Social History     Patient does not qualify to have social determinant information on file (likely too young).   Social History Narrative    Nonsmoker.  Occasional alcohol.    .    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     Three grown children.      Now retired.  [employer]  in [place].     Current Outpatient Medications   Medication Sig Dispense Refill     aspirin 81 MG tablet Take 81 mg by mouth daily With a meal       hydrochlorothiazide (HYDRODIURIL) 25 MG tablet Take 1 tablet (25 mg) by mouth daily 90 tablet 3     Multiple Vitamins-Iron (MULTIVITAMIN/IRON PO)        other medical supplies Shingrix.  Diagnosis:  Z23. 1 each 1     other medical supplies Tdap.  Diagnosis:  Z23. 1 each 0     simvastatin (ZOCOR) 10 MG tablet Take 1 tablet (10 mg) by mouth At Bedtime 90 tablet 3     triamcinolone (KENALOG) 0.1 % ointment        No Known Allergies    Review of Systems   Constitutional: Negative for chills and fever.   Respiratory: Negative for cough.    Musculoskeletal: Positive for myalgias.   Psychiatric/Behavioral: Negative for mood changes. The patient is not nervous/anxious.         OBJECTIVE:     /80 (BP Location: Right arm, Patient Position: Sitting, Cuff Size: Adult Regular)   Pulse 97   Temp 97.9  F (36.6  C) (Tympanic)   Resp 20   Ht 1.613 m (5' 3.5\")   Wt 67 kg (147 lb 12.8 oz)   SpO2 94%   BMI 25.77 kg/m    Body mass index is 25.77 kg/m .  Physical Exam  Constitutional:       General: She is not in acute distress.     Appearance: She is well-developed.   HENT:      Head: Normocephalic.      Right Ear: Tympanic membrane and external ear normal.      Left Ear: Tympanic membrane and " external ear normal.      Nose: Nose normal.      Mouth/Throat:      Pharynx: No oropharyngeal exudate.   Eyes:      General:         Right eye: No discharge.         Left eye: No discharge.      Conjunctiva/sclera: Conjunctivae normal.      Pupils: Pupils are equal, round, and reactive to light.   Neck:      Musculoskeletal: Neck supple.      Thyroid: No thyromegaly.      Trachea: No tracheal deviation.   Cardiovascular:      Rate and Rhythm: Normal rate and regular rhythm.      Pulses: Normal pulses.      Heart sounds: Normal heart sounds, S1 normal and S2 normal. No murmur. No friction rub. No gallop. No S3 or S4 sounds.    Pulmonary:      Effort: Pulmonary effort is normal. No respiratory distress.      Breath sounds: Normal breath sounds. No wheezing or rales.      Comments: Breast exam:  No masses palpable bilaterally.  No skin changes, tethering or axillary lymphadenopathy bilaterally.    Abdominal:      General: Bowel sounds are normal. There is no distension.      Palpations: Abdomen is soft. There is no mass.      Tenderness: There is no tenderness.   Genitourinary:     Comments: Pelvic/Rectal exams deferred per patient.  Musculoskeletal: Normal range of motion.   Lymphadenopathy:      Cervical: No cervical adenopathy.   Skin:     General: Skin is warm and dry.      Findings: No rash.      Comments: Brown macular nevus of several cm in diameter on her right upper arm.  Per patient, this is stable since childhood.   Neurological:      Mental Status: She is alert and oriented to person, place, and time.      Motor: No abnormal muscle tone.      Deep Tendon Reflexes: Reflexes are normal and symmetric.   Psychiatric:         Thought Content: Thought content normal.         Judgement: Judgment normal.           PHQ-2 Score:     PHQ-2 ( 1999 Pfizer) 10/7/2019 10/11/2018   Q1: Little interest or pleasure in doing things 0 0   Q2: Feeling down, depressed or hopeless 0 0   PHQ-2 Score 0 0         I personally  reviewed results withpatient as listed below:   Diagnostic Test Results:  none     ASSESSMENT/PLAN:       ICD-10-CM    1. Essential hypertension I10 hydrochlorothiazide (HYDRODIURIL) 25 MG tablet     Comprehensive Metabolic Panel     Comprehensive Metabolic Panel   2. Hyperlipidemia, unspecified hyperlipidemia type E78.5 simvastatin (ZOCOR) 10 MG tablet     Comprehensive Metabolic Panel     Lipid Panel     Lipid Panel     Comprehensive Metabolic Panel   3. Pain of left lower extremity M79.605    4. Needs flu shot Z23 GH IMM-  HC FLU VACCINE, INCREASED ANTIGEN, PRESV FREE   5. Need for Tdap vaccination Z23 other medical supplies   6. Need for shingles vaccine Z23 other medical supplies   7. Health care maintenance Z00.00        1.  Blood pressure stable.  Refilled hydrochlorothiazide and Comprehensive Metabolic Profile.  Discussed that the cramping in her legs could be a side effect of hydrochlorothiazide.  Will check Comprehensive Metabolic Profile to ensure electrolytes are normal.  Recommend eating a banana daily if possible and drink plenty of fluids.  Offered to try a different antihypertensive to see if this alleviates some of her leg cramps, but she declines at this time.  2.  Comprehensive Metabolic Profile and lipids as above.  Simvastatin refilled.  3.  See #1.  Offered imaging, but she declines at this time.  4.  Flu shot updated today.  5.  Prescription given to obtain Tdap at outside pharmacy.  6.  Prescription given to obtain Shingrix at outside pharmacy.  7.  Mammogram, DEXA and abdominal aortic aneurysm screening declined for now by patient.  She might consider doing these next spring and will call if she would like to schedule.  Colonoscopy is up to date (2013).  No further colonoscopies recommended given her age.  Pap Smear deferred due to age > 65.  Pneumonia vaccines are up to date.    Alexa Mix MD  [facility] AND [facility]    Portions of this dictation were created using the  Dragon Nuance voice recognition system. Proofreading was completed but there may be errors in text.

## 2019-10-07 NOTE — NURSING NOTE
"Chief Complaint   Patient presents with     Recheck Medication     medication management        Initial /80 (BP Location: Right arm, Patient Position: Sitting, Cuff Size: Adult Regular)   Pulse 97   Temp 97.9  F (36.6  C) (Tympanic)   Resp 20   Ht 1.613 m (5' 3.5\")   Wt 67 kg (147 lb 12.8 oz)   SpO2 94%   BMI 25.77 kg/m   Estimated body mass index is 25.77 kg/m  as calculated from the following:    Height as of this encounter: 1.613 m (5' 3.5\").    Weight as of this encounter: 67 kg (147 lb 12.8 oz).  Medication Reconciliation: complete    Lulu Moody LPN  "

## 2019-10-07 NOTE — LETTER
Oanh Bowen  [address] [facility] [address]    10/10/2019      Dear Ms. Bowen,      I wanted to let you know about your recent labs.  Your lipids showed a mild increase in your triglycerides, but the rest of your lipids were in good range.  I would recommend continuing your current dose of Simvastatin for now.     Your Comprehensive Metabolic Profile showed that your glucose is 133.  This was a fasting result.  A fasting glucose of 126 or above is in the range to be considered diabetic.  To confirm whether you have diabetes, I would recommend returning to the clinic to have a repeat fasting glucose and an A1c completed.  If you have another glucose of 126 or above, you would meet the criteria for diabetes.  I have orders for these labs in your chart. Please call [phone] to make a lab appointment to have these drawn.    The remainder of your labs were in good range.    Please contact us at [phone] with any questions or concerns that you have.    I have attached your lab results for your records.        Sincerely,         Alexa Mix MD MD    Resulted Orders   Lipid Panel   Result Value Ref Range    Cholesterol 187 <200 mg/dL    Triglycerides 182 (H) <150 mg/dL      Comment:      Borderline high:  150-199 mg/dl  High:             200-499 mg/dl  Very high:       >499 mg/dl      HDL Cholesterol 62 23 - 92 mg/dL    LDL Cholesterol Calculated 89 <100 mg/dL      Comment:      Desirable:       <100 mg/dl    Non HDL Cholesterol 125 <130 mg/dL   Comprehensive Metabolic Panel   Result Value Ref Range    Sodium 137 134 - 144 mmol/L    Potassium 3.6 3.5 - 5.1 mmol/L    Chloride 99 98 - 107 mmol/L    Carbon Dioxide 29 21 - 31 mmol/L    Anion Gap 9 3 - 14 mmol/L    Glucose 133 (H) 70 - 105 mg/dL    Urea Nitrogen 14 7 - 25 mg/dL    Creatinine 0.92 0.60 - 1.20 mg/dL    GFR Estimate 59 (L) >60 mL/min/[1.73_m2]    GFR Estimate If Black 71 >60 mL/min/[1.73_m2]    Calcium 10.0 8.6 - 10.3  mg/dL    Bilirubin Total 0.6 0.3 - 1.0 mg/dL    Albumin 4.4 3.5 - 5.7 g/dL    Protein Total 7.8 6.4 - 8.9 g/dL    Alkaline Phosphatase 62 34 - 104 U/L    ALT 14 7 - 52 U/L    AST 14 13 - 39 U/L

## 2020-10-06 ENCOUNTER — OFFICE VISIT (OUTPATIENT)
Dept: FAMILY MEDICINE | Facility: OTHER | Age: 80
End: 2020-10-06
Attending: FAMILY MEDICINE
Payer: MEDICARE

## 2020-10-06 VITALS
OXYGEN SATURATION: 96 % | WEIGHT: 149.25 LBS | RESPIRATION RATE: 18 BRPM | HEART RATE: 77 BPM | TEMPERATURE: 96.8 F | DIASTOLIC BLOOD PRESSURE: 74 MMHG | BODY MASS INDEX: 25.48 KG/M2 | HEIGHT: 64 IN | SYSTOLIC BLOOD PRESSURE: 120 MMHG

## 2020-10-06 DIAGNOSIS — Z12.31 VISIT FOR SCREENING MAMMOGRAM: ICD-10-CM

## 2020-10-06 DIAGNOSIS — Z00.00 MEDICARE ANNUAL WELLNESS VISIT, SUBSEQUENT: Primary | ICD-10-CM

## 2020-10-06 DIAGNOSIS — I10 ESSENTIAL HYPERTENSION: ICD-10-CM

## 2020-10-06 DIAGNOSIS — E11.9 TYPE 2 DIABETES MELLITUS WITHOUT COMPLICATION, WITHOUT LONG-TERM CURRENT USE OF INSULIN (H): ICD-10-CM

## 2020-10-06 DIAGNOSIS — E78.5 HYPERLIPIDEMIA, UNSPECIFIED HYPERLIPIDEMIA TYPE: ICD-10-CM

## 2020-10-06 DIAGNOSIS — R73.9 HYPERGLYCEMIA: ICD-10-CM

## 2020-10-06 DIAGNOSIS — Z23 NEEDS FLU SHOT: ICD-10-CM

## 2020-10-06 LAB
ALBUMIN SERPL-MCNC: 4.3 G/DL (ref 3.5–5.7)
ALP SERPL-CCNC: 53 U/L (ref 34–104)
ALT SERPL W P-5'-P-CCNC: 15 U/L (ref 7–52)
ANION GAP SERPL CALCULATED.3IONS-SCNC: 11 MMOL/L (ref 3–14)
AST SERPL W P-5'-P-CCNC: 16 U/L (ref 13–39)
BILIRUB SERPL-MCNC: 0.7 MG/DL (ref 0.3–1)
BUN SERPL-MCNC: 16 MG/DL (ref 7–25)
CALCIUM SERPL-MCNC: 9.8 MG/DL (ref 8.6–10.3)
CHLORIDE SERPL-SCNC: 100 MMOL/L (ref 98–107)
CHOLEST SERPL-MCNC: 183 MG/DL
CO2 SERPL-SCNC: 27 MMOL/L (ref 21–31)
CREAT SERPL-MCNC: 0.96 MG/DL (ref 0.6–1.2)
CREAT UR-MCNC: 58 MG/DL
GFR SERPL CREATININE-BSD FRML MDRD: 56 ML/MIN/{1.73_M2}
GLUCOSE SERPL-MCNC: 146 MG/DL (ref 70–105)
GLUCOSE SERPL-MCNC: 148 MG/DL (ref 70–105)
HBA1C MFR BLD: 6.4 % (ref 4–6)
HDLC SERPL-MCNC: 53 MG/DL (ref 23–92)
LDLC SERPL CALC-MCNC: 85 MG/DL
MICROALBUMIN UR-MCNC: 8 MG/L
MICROALBUMIN/CREAT UR: 13.12 MG/G CR (ref 0–25)
NONHDLC SERPL-MCNC: 130 MG/DL
POTASSIUM SERPL-SCNC: 3.9 MMOL/L (ref 3.5–5.1)
PROT SERPL-MCNC: 7.2 G/DL (ref 6.4–8.9)
SODIUM SERPL-SCNC: 138 MMOL/L (ref 134–144)
TRIGL SERPL-MCNC: 226 MG/DL
TSH SERPL DL<=0.05 MIU/L-ACNC: 2.23 IU/ML (ref 0.34–5.6)

## 2020-10-06 PROCEDURE — 82947 ASSAY GLUCOSE BLOOD QUANT: CPT | Mod: ZL | Performed by: FAMILY MEDICINE

## 2020-10-06 PROCEDURE — G0463 HOSPITAL OUTPT CLINIC VISIT: HCPCS | Mod: 25

## 2020-10-06 PROCEDURE — 90662 IIV NO PRSV INCREASED AG IM: CPT

## 2020-10-06 PROCEDURE — 82043 UR ALBUMIN QUANTITATIVE: CPT | Mod: ZL | Performed by: FAMILY MEDICINE

## 2020-10-06 PROCEDURE — 99213 OFFICE O/P EST LOW 20 MIN: CPT | Mod: 25 | Performed by: FAMILY MEDICINE

## 2020-10-06 PROCEDURE — 80061 LIPID PANEL: CPT | Mod: ZL | Performed by: FAMILY MEDICINE

## 2020-10-06 PROCEDURE — 80053 COMPREHEN METABOLIC PANEL: CPT | Mod: ZL | Performed by: FAMILY MEDICINE

## 2020-10-06 PROCEDURE — G0439 PPPS, SUBSEQ VISIT: HCPCS | Performed by: FAMILY MEDICINE

## 2020-10-06 PROCEDURE — 84443 ASSAY THYROID STIM HORMONE: CPT | Mod: ZL | Performed by: FAMILY MEDICINE

## 2020-10-06 PROCEDURE — 99213 OFFICE O/P EST LOW 20 MIN: CPT | Performed by: FAMILY MEDICINE

## 2020-10-06 PROCEDURE — 83036 HEMOGLOBIN GLYCOSYLATED A1C: CPT | Mod: ZL | Performed by: FAMILY MEDICINE

## 2020-10-06 PROCEDURE — 36415 COLL VENOUS BLD VENIPUNCTURE: CPT | Mod: ZL | Performed by: FAMILY MEDICINE

## 2020-10-06 RX ORDER — SIMVASTATIN 10 MG
10 TABLET ORAL AT BEDTIME
Qty: 90 TABLET | Refills: 3 | Status: SHIPPED | OUTPATIENT
Start: 2020-10-06 | End: 2021-10-07

## 2020-10-06 RX ORDER — LISINOPRIL 10 MG/1
10 TABLET ORAL DAILY
Qty: 90 TABLET | Refills: 3 | Status: SHIPPED | OUTPATIENT
Start: 2020-10-06 | End: 2021-10-07

## 2020-10-06 RX ORDER — HYDROCHLOROTHIAZIDE 25 MG/1
25 TABLET ORAL DAILY
Qty: 90 TABLET | Refills: 3 | Status: SHIPPED | OUTPATIENT
Start: 2020-10-06 | End: 2020-10-06

## 2020-10-06 RX ORDER — LANCING DEVICE
EACH MISCELLANEOUS
Qty: 1 EACH | Refills: 0 | Status: SHIPPED | OUTPATIENT
Start: 2020-10-06 | End: 2021-04-06

## 2020-10-06 ASSESSMENT — PAIN SCALES - GENERAL: PAINLEVEL: NO PAIN (0)

## 2020-10-06 ASSESSMENT — MIFFLIN-ST. JEOR: SCORE: 1129.05

## 2020-10-06 NOTE — NURSING NOTE
Patient presents to clinic for annual Medicare Wellness exam.  Medication Reconciliation: complete    aMnisha Goldman, KAYLINN

## 2020-10-06 NOTE — LETTER
Oanh Bowen  [address] [facility] [address]    10/6/2020      Dear Ms. Bowen,      I wanted to let you know about your recent labs.  Your TSH was in normal range.  Your lipid profile showed that your triglycerides were elevated, but your LDL and other lipids were otherwise in normal range.  I would recommend staying on your current dose of Simvastatin at this time.  Your Comprehensive Metabolic Profile showed your glucose was 146, but otherwise normal.    Please contact us at [phone] with any questions or concerns that you have.    I have attached your lab results for your records.        Sincerely,         Alexa Mix MD     Resulted Orders   TSH Reflex GH   Result Value Ref Range    TSH Reflex 2.23 0.34 - 5.60 IU/mL   Lipid Profile   Result Value Ref Range    Cholesterol 183 <200 mg/dL    Triglycerides 226 (H) <150 mg/dL      Comment:      Borderline high:  150-199 mg/dl  High:             200-499 mg/dl  Very high:       >499 mg/dl      HDL Cholesterol 53 23 - 92 mg/dL    LDL Cholesterol Calculated 85 <100 mg/dL      Comment:      Desirable:       <100 mg/dl    Non HDL Cholesterol 130 (H) <130 mg/dL      Comment:      Above Desirable:  130-159 mg/dl  Borderline high:  160-189 mg/dl  High:             190-219 mg/dl  Very high:       >219 mg/dl     Comprehensive metabolic panel   Result Value Ref Range    Sodium 138 134 - 144 mmol/L    Potassium 3.9 3.5 - 5.1 mmol/L    Chloride 100 98 - 107 mmol/L    Carbon Dioxide 27 21 - 31 mmol/L    Anion Gap 11 3 - 14 mmol/L    Glucose 146 (H) 70 - 105 mg/dL    Urea Nitrogen 16 7 - 25 mg/dL    Creatinine 0.96 0.60 - 1.20 mg/dL    GFR Estimate 56 (L) >60 mL/min/[1.73_m2]    GFR Estimate If Black 68 >60 mL/min/[1.73_m2]    Calcium 9.8 8.6 - 10.3 mg/dL    Bilirubin Total 0.7 0.3 - 1.0 mg/dL    Albumin 4.3 3.5 - 5.7 g/dL    Protein Total 7.2 6.4 - 8.9 g/dL    Alkaline Phosphatase 53 34 - 104 U/L    ALT 15 7 - 52 U/L    AST 16 13 - 39 U/L

## 2020-10-06 NOTE — LETTER
Oanh BRENNA Bowen  [address] [facility] [address]    10/8/2020      Dear Ms. Bowen,      I wanted to let you know that your urine microalbumin/creatinine ratio test returned normal.      Please contact us at [phone] with any questions or concerns that you have.    I have attached your lab results for your records.        Sincerely,         Alexa Mix MD     Resulted Orders   TSH Reflex GH   Result Value Ref Range    TSH Reflex 2.23 0.34 - 5.60 IU/mL   Lipid Profile   Result Value Ref Range    Cholesterol 183 <200 mg/dL    Triglycerides 226 (H) <150 mg/dL      Comment:      Borderline high:  150-199 mg/dl  High:             200-499 mg/dl  Very high:       >499 mg/dl      HDL Cholesterol 53 23 - 92 mg/dL    LDL Cholesterol Calculated 85 <100 mg/dL      Comment:      Desirable:       <100 mg/dl    Non HDL Cholesterol 130 (H) <130 mg/dL      Comment:      Above Desirable:  130-159 mg/dl  Borderline high:  160-189 mg/dl  High:             190-219 mg/dl  Very high:       >219 mg/dl     Comprehensive metabolic panel   Result Value Ref Range    Sodium 138 134 - 144 mmol/L    Potassium 3.9 3.5 - 5.1 mmol/L    Chloride 100 98 - 107 mmol/L    Carbon Dioxide 27 21 - 31 mmol/L    Anion Gap 11 3 - 14 mmol/L    Glucose 146 (H) 70 - 105 mg/dL    Urea Nitrogen 16 7 - 25 mg/dL    Creatinine 0.96 0.60 - 1.20 mg/dL    GFR Estimate 56 (L) >60 mL/min/[1.73_m2]    GFR Estimate If Black 68 >60 mL/min/[1.73_m2]    Calcium 9.8 8.6 - 10.3 mg/dL    Bilirubin Total 0.7 0.3 - 1.0 mg/dL    Albumin 4.3 3.5 - 5.7 g/dL    Protein Total 7.2 6.4 - 8.9 g/dL    Alkaline Phosphatase 53 34 - 104 U/L    ALT 15 7 - 52 U/L    AST 16 13 - 39 U/L   Albumin Random Urine Quantitative with Creat Ratio   Result Value Ref Range    Creatinine Urine 58 mg/dL    Albumin Urine mg/L 8 mg/L    Albumin Urine mg/g Cr 13.12 0 - 25 mg/g Cr

## 2020-10-07 DIAGNOSIS — E11.9 TYPE 2 DIABETES MELLITUS WITHOUT COMPLICATION, WITHOUT LONG-TERM CURRENT USE OF INSULIN (H): Primary | ICD-10-CM

## 2020-10-07 NOTE — TELEPHONE ENCOUNTER
"Note from pharmacy:    Medicare requires specific directions for billing (not \"or as directed\". Please send new prescriptions.    10/06/20 0926 Alexa Barr MD     blood glucose (NO BRAND SPECIFIED) lancets standard 100 each 11 10/6/2020  --   Sig: Use to test blood sugar 1 times daily or as directed.     blood glucose (NO BRAND SPECIFIED) test strip 100 each 11 10/6/2020  --   Sig: Use to test blood sugar 1 times daily or as directed.     blood glucose monitoring (NO BRAND SPECIFIED) meter device kit 1 kit 0 10/6/2020  --   Sig: Use to test blood sugar 1 times daily or as directed.   [facility] - [address].    Updated prescriptions sent per [facility] refill protocol, and the above orders.     Manisha Berry RN .............. 10/7/2020  11:08 AM    "

## 2020-10-21 ENCOUNTER — ALLIED HEALTH/NURSE VISIT (OUTPATIENT)
Dept: EDUCATION SERVICES | Facility: OTHER | Age: 80
End: 2020-10-21
Attending: FAMILY MEDICINE
Payer: MEDICARE

## 2020-10-21 DIAGNOSIS — E11.9 TYPE 2 DIABETES MELLITUS WITHOUT COMPLICATION, WITHOUT LONG-TERM CURRENT USE OF INSULIN (H): Primary | ICD-10-CM

## 2020-10-21 PROCEDURE — G0108 DIAB MANAGE TRN  PER INDIV: HCPCS

## 2020-10-21 NOTE — PROGRESS NOTES
"Diabetes Self-Management Education & Support    Presents for: Initial Assessment for new diagnosis    SUBJECTIVE/OBJECTIVE:  Presents for: Initial Assessment for new diagnosis  Accompanied by: Self  Diabetes education in the past 24mo: No  Focus of Visit: Monitoring, Being Active, Diabetes Pathophysiology, Healthy Eating  Diabetes type: Type 2  Date of diagnosis: 10/2018  Disease course: Stable  How confident are you filling out medical forms by yourself:: Quite a bit  Transportation concerns: No  Difficulty affording diabetes medication?: No  Difficulty affording diabetes testing supplies?: No  Cultural Influences/Ethnic Background:  Not  or     Diabetes Symptoms & Complications:  Fatigue: Sometimes  Neuropathy: No  Polydipsia: No  Polyphagia: No  Polyuria: No  Visual change: No  Slow healing wounds: No  Symptom course: Stable  Weight trend: Fluctuating       Patient Problem List and Family Medical History reviewed for relevant medical history, current medical status, and diabetes risk factors.    Vitals:  LMP  (LMP Unknown)   Estimated body mass index is 26.02 kg/m  as calculated from the following:    Height as of 10/6/20: 1.613 m (5' 3.5\").    Weight as of 10/6/20: 67.7 kg (149 lb 4 oz).   Last 3 BP:   BP Readings from Last 3 Encounters:   10/06/20 120/74   10/07/19 120/80   10/11/18 138/78       History   Smoking Status     Never Smoker   Smokeless Tobacco     Never Used       Labs:  Lab Results   Component Value Date    A1C 6.4 10/06/2020     Lab Results   Component Value Date     10/06/2020     10/06/2020     Lab Results   Component Value Date    LDL 85 10/06/2020     HDL Cholesterol   Date Value Ref Range Status   10/06/2020 53 23 - 92 mg/dL Final   ]  GFR Estimate   Date Value Ref Range Status   10/06/2020 56 (L) >60 mL/min/[1.73_m2] Final     GFR Estimate If Black   Date Value Ref Range Status   10/06/2020 68 >60 mL/min/[1.73_m2] Final     Lab Results   Component Value Date    " "CR 0.96 10/06/2020     No results found for: MICROALBUMIN    Healthy Eating:  Healthy Eating Assessed Today: Yes  Cultural/Restorationism diet restrictions?: No  Meal planning/habits: None  How many times a week on average do you eat food made away from home (restaurant/take-out)?: 2  Meals include: Breakfast, Lunch, Dinner  Breakfast: bowl of cereal or toast  Lunch: salad or leftovers or sandwich  Dinner: Main meal - hotdish or soup or hamburgers/hotdogs on the grill  Snacks: at times will have an after supper snack of ice cream  Beverages: Coffee, Water, Alcohol  Has patient met with a dietitian in the past?: No    Being Active:  Being Active Assessed Today: Yes  Exercise:: Currently not exercising  Barrier to exercise: None    Monitoring:  Did patient bring glucose meter to appointment? : Yes  Blood Glucose Meter: One Touch    2-hr PP  mg/dL.    Taking Medications:      Taking Medication Assessed Today: Yes  Current Treatments: None    Problem Solving:  Is the patient at risk for hypoglycemia?: No  Is the patient at risk for DKA?: No  Does patient have severe weather/disaster plan for diabetes management?: No  Does patient have sick day plan for diabetes management?: No              Reducing Risks:  Reducing Risks Assessed Today: Yes  Diabetes Risks: Age over 45 years, Hyperlipidemia, Family History  CAD Risks: Diabetes Mellitus, Hypertension, Post-menopausal  Has dilated eye exam at least once a year?: Yes(\"I have an eye exam scheduled.  Last visit was 1 1/2 years ago.\")  Sees dentist every 6 months?: No  Feet checked by healthcare provider in the last year?: Yes    Healthy Coping:  Healthy Coping Assessed Today: Yes  Emotional response to diabetes: Acceptance  Informal Support system:: Spouse, Children  Stage of change: PREPARATION (Decided to change - considering how)  Support resources: Offerings in Clinic Communities, Magazines  Patient Activation Measure Survey Score:  No flowsheet data found.    Diabetes " knowledge and skills assessment:   Patient is knowledgeable in diabetes management concepts related to: Healthy Eating, Being Active, Monitoring, Reducing Risks and Healthy Coping    Patient needs further education on the following diabetes management concepts: Healthy Eating, Being Active, Monitoring, Reducing Risks and Healthy Coping    Based on learning assessment above, most appropriate setting for further diabetes education would be: Group class or Individual setting.      INTERVENTIONS:    Education provided today on:  AADE Self-Care Behaviors:  Diabetes Pathophysiology  Healthy Eating: carbohydrate counting and label reading  Being Active: relationship to blood glucose  Monitoring: purpose, proper technique, log and interpret results, individual blood glucose targets, frequency of monitoring and proper sharps disposal  Reducing Risks: major complications of diabetes, prevention, early diagnostic measures and treatment of complications and A1C - goals, relating to blood glucose levels, how often to check  Healthy Coping: benefits of making appropriate lifestyle changes    Opportunities for ongoing education and support in diabetes-self management were discussed.    Pt verbalized understanding of concepts discussed and recommendations provided today.       Education Materials Provided:  My Food Plan, Activity Pyramid, A1c flier, Tips on SMBG, Diabetes Success Plan, DSMS sheet and New T2DM folder.        ASSESSMENT:  Patient newly diagnosed DM2.  Discussed pathophysiology with interpretation and meaning of HgA1c.  Stressed importance of testing BG daily to help keep tight control of DM2, helping to minimize risk for possible complications of uncontrolled diabetes.  Discussed benefits of keeping A1c under 7% and encouraged patient to begin testing BG daily.          Patient's most recent   Lab Results   Component Value Date    A1C 6.4 10/06/2020    is meeting goal of <7.0    PLAN  See Patient Instructions for  co-developed, patient-stated behavior change goals.  AVS printed and provided to patient today. See Follow-Up section for recommended follow-up.    Kylei Tse RN, BSN, [facility]  10/21/2020 10:01 AM   Time Spent: 60 minutes  Encounter Type: Individual    Any diabetes medication dose changes were made via the CDE Protocol and Collaborative Practice Agreement with the patient's primary care provider. A copy of this encounter was shared with the provider.

## 2020-10-21 NOTE — PATIENT INSTRUCTIONS
Diabetes Goals and Reminders    Your A1c test should be done every 3 months.  Your goal is less than 7%.   Your last result is:  Lab Results   Component Value Date    A1C 6.4 10/06/2020       Your LDL cholesterol test should be done at least once a year.  Take a statin, if prescribed by your doctor, based on age and risk factors.  Your last result is:  LDL Cholesterol Calculated   Date Value Ref Range Status   10/06/2020 85 <100 mg/dL Final     Comment:     Desirable:       <100 mg/dl       Have blood pressure and weight checked every three months.  Your blood pressure goal is 140/90 or less.  Your last blood pressure reading was:   BP Readings from Last 1 Encounters:   10/06/20 120/74       Test your blood sugar 1 times per day.  Your home blood glucose target ranges are:  Fasting or Before meals:   2 hours after a meal: Less than 180      Avoid all tobacco.  Follow your healthy diet and exercise plan.  See the eye doctor every year.  See the dentist every six months.  Have kidney function tested yearly.    Take all medicine as prescribed.  Please let me know if you are having symptoms you don t expect or if you wish to stop any medicine.    Follow Up Plan  Please call or visit Diabetes Education if blood sugars are consistently out of target.  Your future lab plan is:  HgA1c in January 2021.    If you need your cholesterol checked at your next appointment, you should fast 8 to 10 hours before your appointment.  Do not eat or drink anything besides water.  Drink plenty of water and take all your usual medicine.    SUMMARY FOR TODAY'S VISIT    1.  Begin testing blood sugar 1 time(s) daily, as directed.    2.  Discussed the low carb plan to help decrease weight, improve blood pressure, improve A1c, decrease triglycerides and increase good HDL cholesterol.  These benefits were summarized from the ADA Consensus report in 2019.      Begin carbohydrate counting, low carb plan:  Up to ~ 15 grams with breakfast, 30  grams with lunch and 30 grams with supper.       A key strategy in this plan is, along with medication need, to participate in low to moderate physical activity, such as walking, working up to a minimum of 30 minutes most days, as tolerated.      3.  Follow-up for continued diabetes education, as needed.     Kylie Tse RN, BSN, [facility]  10/21/2020 9:22 AM

## 2020-11-04 ENCOUNTER — HOSPITAL ENCOUNTER (OUTPATIENT)
Dept: MAMMOGRAPHY | Facility: OTHER | Age: 80
Discharge: HOME | End: 2020-11-04
Attending: FAMILY MEDICINE | Admitting: FAMILY MEDICINE
Payer: MEDICARE

## 2020-11-04 DIAGNOSIS — Z12.31 VISIT FOR SCREENING MAMMOGRAM: ICD-10-CM

## 2020-11-04 PROCEDURE — 77063 BREAST TOMOSYNTHESIS BI: CPT

## 2020-11-09 ENCOUNTER — TRANSFERRED RECORDS (OUTPATIENT)
Dept: HEALTH INFORMATION MANAGEMENT | Facility: OTHER | Age: 80
End: 2020-11-09

## 2020-11-09 LAB — RETINOPATHY: NEGATIVE

## 2021-01-07 ENCOUNTER — OFFICE VISIT (OUTPATIENT)
Dept: FAMILY MEDICINE | Facility: OTHER | Age: 81
End: 2021-01-07
Attending: FAMILY MEDICINE
Payer: MEDICARE

## 2021-01-07 VITALS
BODY MASS INDEX: 25.63 KG/M2 | RESPIRATION RATE: 18 BRPM | DIASTOLIC BLOOD PRESSURE: 88 MMHG | HEART RATE: 73 BPM | OXYGEN SATURATION: 99 % | WEIGHT: 147 LBS | SYSTOLIC BLOOD PRESSURE: 132 MMHG | TEMPERATURE: 97.6 F

## 2021-01-07 DIAGNOSIS — E11.9 TYPE 2 DIABETES MELLITUS WITHOUT COMPLICATION, WITHOUT LONG-TERM CURRENT USE OF INSULIN (H): Primary | ICD-10-CM

## 2021-01-07 DIAGNOSIS — E78.5 HYPERLIPIDEMIA, UNSPECIFIED HYPERLIPIDEMIA TYPE: ICD-10-CM

## 2021-01-07 DIAGNOSIS — I10 ESSENTIAL HYPERTENSION: ICD-10-CM

## 2021-01-07 LAB
ALBUMIN SERPL-MCNC: 4.5 G/DL (ref 3.5–5.7)
ALP SERPL-CCNC: 55 U/L (ref 34–104)
ALT SERPL W P-5'-P-CCNC: 11 U/L (ref 7–52)
ANION GAP SERPL CALCULATED.3IONS-SCNC: 7 MMOL/L (ref 3–14)
AST SERPL W P-5'-P-CCNC: 12 U/L (ref 13–39)
BILIRUB SERPL-MCNC: 0.6 MG/DL (ref 0.3–1)
BUN SERPL-MCNC: 19 MG/DL (ref 7–25)
CALCIUM SERPL-MCNC: 10.2 MG/DL (ref 8.6–10.3)
CHLORIDE SERPL-SCNC: 103 MMOL/L (ref 98–107)
CHOLEST SERPL-MCNC: 176 MG/DL
CO2 SERPL-SCNC: 28 MMOL/L (ref 21–31)
CREAT SERPL-MCNC: 0.94 MG/DL (ref 0.6–1.2)
GFR SERPL CREATININE-BSD FRML MDRD: 57 ML/MIN/{1.73_M2}
GLUCOSE SERPL-MCNC: 132 MG/DL (ref 70–105)
HBA1C MFR BLD: 5.8 % (ref 4–6)
HDLC SERPL-MCNC: 54 MG/DL (ref 23–92)
LDLC SERPL CALC-MCNC: 91 MG/DL
NONHDLC SERPL-MCNC: 122 MG/DL
POTASSIUM SERPL-SCNC: 4 MMOL/L (ref 3.5–5.1)
PROT SERPL-MCNC: 7.6 G/DL (ref 6.4–8.9)
SODIUM SERPL-SCNC: 138 MMOL/L (ref 134–144)
TRIGL SERPL-MCNC: 154 MG/DL

## 2021-01-07 PROCEDURE — 36415 COLL VENOUS BLD VENIPUNCTURE: CPT | Mod: ZL | Performed by: FAMILY MEDICINE

## 2021-01-07 PROCEDURE — 80061 LIPID PANEL: CPT | Mod: ZL | Performed by: FAMILY MEDICINE

## 2021-01-07 PROCEDURE — 83036 HEMOGLOBIN GLYCOSYLATED A1C: CPT | Mod: ZL | Performed by: FAMILY MEDICINE

## 2021-01-07 PROCEDURE — C9803 HOPD COVID-19 SPEC COLLECT: HCPCS

## 2021-01-07 PROCEDURE — 99214 OFFICE O/P EST MOD 30 MIN: CPT | Performed by: FAMILY MEDICINE

## 2021-01-07 PROCEDURE — G0463 HOSPITAL OUTPT CLINIC VISIT: HCPCS

## 2021-01-07 PROCEDURE — 80053 COMPREHEN METABOLIC PANEL: CPT | Mod: ZL | Performed by: FAMILY MEDICINE

## 2021-01-07 RX ORDER — LANCETS 33 GAUGE
EACH MISCELLANEOUS
COMMUNITY
Start: 2020-12-31

## 2021-01-07 RX ORDER — BLOOD-GLUCOSE METER
EACH MISCELLANEOUS
COMMUNITY
Start: 2020-10-07

## 2021-01-07 ASSESSMENT — ENCOUNTER SYMPTOMS
NERVOUS/ANXIOUS: 0
SHORTNESS OF BREATH: 0
COUGH: 0
CHILLS: 0
FEVER: 0

## 2021-01-07 ASSESSMENT — PAIN SCALES - GENERAL: PAINLEVEL: NO PAIN (0)

## 2021-01-07 NOTE — PROGRESS NOTES
"  SUBJECTIVE:   Nursing Notes:   Candace Hugo LPN  2021  8:35 AM  Sign at exiting of workspace  Chief Complaint   Patient presents with     Diabetes       Initial /88   Pulse 73   Temp 97.6  F (36.4  C) (Tympanic)   Resp 18   Wt 66.7 kg (147 lb)   LMP  (LMP Unknown)   SpO2 99%   Breastfeeding No   BMI 25.63 kg/m   Estimated body mass index is 25.63 kg/m  as calculated from the following:    Height as of 10/6/20: 1.613 m (5' 3.5\").    Weight as of this encounter: 66.7 kg (147 lb).  Medication Reconciliation: complete    Candace Hugo LPN  Previous A1C is at goal of <8  Lab Results   Component Value Date    A1C 6.4 10/06/2020    A1C 6.7 10/11/2018     Urine microalbumin:creatine: 10/6/2020  Foot exam -10/06/2020  Eye exam 10/2020    Tobacco User no  Patient is on a daily aspirin  Patient is on a Statin.  Blood pressure today of:     BP Readings from Last 1 Encounters:   21 132/88      is at the goal of <139/89 for diabetics.    Candace Hugo LPN on 2021 at 8:30 AM          Oanh Bowen is a 80 year old female who presents to clinic today for a diabetic check.  Oanh has been doing well.  She had seen the Diabetic Educator and has been checking her blood sugars regularly.  She has lost a couple of pounds since she was here last.    HPI    Frequency of checking blood sugars: daily, fasting  Blood sugar range:  Fastin this morning.  Usually around 120.  Lunch:  n/a  Supper:  n/a  Bedtime:  n/a  Lows under 70?  no  Highs above 200?  no  Symptoms of hypoglycemia?   no  Sores on feet?  no  Numbness/Paresthesias?  no  Excessive thirst or urination?   no  Unintended weight loss or gain?  no  Taking aspirin?   yes  On statin?  Simvastatin   Taking ACEI/ARB?  Lisinopril   Last eye exam:  10/2020  Eye doctor:  Dr. Glass, stable exam  Smoking?  no  Interested in cessation?  n/a      I personally reviewed medications/allergies/history listed below:    Patient Active Problem List "    Diagnosis Date Noted     Type 2 diabetes mellitus without complication, without long-term current use of insulin (H) 01/07/2021     Priority: Medium     Hyperlipidemia, unspecified hyperlipidemia type 10/11/2018     Priority: Medium     Essential hypertension 10/11/2018     Priority: Medium     Hyperglycemia 10/11/2018     Priority: Medium     Past Medical History:   Diagnosis Date     Cataract     inactive icd-9 diagnosis auto replaced with icd-10, display name retained//mporz     Essential (primary) hypertension     No Comments Provided     Hyperlipidemia     No Comments Provided     Osteoarthritis     No Comments Provided     Personal history of other diseases of the female genital tract     Child birth x 3      Past Surgical History:   Procedure Laterality Date     COLONOSCOPY      10/24/2013,diverticulosis-no further colonoscopy rec d/t age     OTHER SURGICAL HISTORY      688635,OTHER,Bilateral tubal ligation     Family History   Problem Relation Age of Onset     Cancer Mother         Cancer, lung cancer     Other - See Comments Father 70        Stroke     Breast Cancer No family hx of         Cancer-breast     Social History     Tobacco Use     Smoking status: Never Smoker     Smokeless tobacco: Never Used   Substance Use Topics     Alcohol use: Yes     Social History     Social History Narrative    Nonsmoker.  Occasional alcohol.    .    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     Three grown children.      Now retired.  [employer]  in [place].     Current Outpatient Medications   Medication Sig Dispense Refill     aspirin 81 MG tablet Take 81 mg by mouth daily With a meal       blood glucose (NO BRAND SPECIFIED) lancets standard Use to test blood sugar 1 time daily. Dispense as insurance allows. Dx. Code: E11.9. 100 each 11     blood glucose (NO BRAND SPECIFIED) lancing device Device to be used with lancets. 1 each 0     blood glucose (NO BRAND SPECIFIED) test strip Use to test  blood sugar 1 time daily. Dispense as insurance allows. Dx. Code: E11.9. 100 each 11     blood glucose monitoring (NO BRAND SPECIFIED) meter device kit Use to test blood sugar 1 time daily. Dispense as insurance allows. Dx. Code: E11.9. 1 kit 0     Blood Glucose Monitoring Suppl (ONETOUCH VERIO FLEX SYSTEM) w/Device KIT USE TO CHECK GLUCOSE ONCE DAILY       Lancets (ONETOUCH DELICA PLUS [id number]) MISC USE TO CHECK GLUCOSE ONCE DAILY       lisinopril (ZESTRIL) 10 MG tablet Take 1 tablet (10 mg) by mouth daily 90 tablet 3     Multiple Vitamins-Iron (MULTIVITAMIN/IRON PO)        simvastatin (ZOCOR) 10 MG tablet Take 1 tablet (10 mg) by mouth At Bedtime 90 tablet 3     No Known Allergies    Review of Systems   Constitutional: Negative for chills and fever.   Respiratory: Negative for cough and shortness of breath.    Cardiovascular: Negative for peripheral edema.   Psychiatric/Behavioral: Negative for mood changes. The patient is not nervous/anxious.         OBJECTIVE:     /88   Pulse 73   Temp 97.6  F (36.4  C) (Tympanic)   Resp 18   Wt 66.7 kg (147 lb)   LMP  (LMP Unknown)   SpO2 99%   Breastfeeding No   BMI 25.63 kg/m    Body mass index is 25.63 kg/m .  Physical Exam      PHQ-9 SCORE 10/20/2016   PHQ-9 Total Score 0       PHQ-2 Score:     PHQ-2 ( 1999 Pfizer) 1/7/2021 10/6/2020   Q1: Little interest or pleasure in doing things 0 0   Q2: Feeling down, depressed or hopeless 0 0   PHQ-2 Score 0 0         I personally reviewed results withpatient as listed below:   Diagnostic Test Results:  No results found for this or any previous visit (from the past 24 hour(s)).    ASSESSMENT/PLAN:       ICD-10-CM    1. Type 2 diabetes mellitus without complication, without long-term current use of insulin (H)  E11.9    2. Essential hypertension  I10    3. Hyperlipidemia, unspecified hyperlipidemia type  E78.5        1. Stable.  Labs pending today.  Blood pressure is well controlled. Eye exam is up to date.  On aspirin  daily.  Non-smoker.  On ACE-I (lisinopril) and statin (Simvastatin).  Follow up in 3-4 months.  She is still planning to go to [place] this winter and is hoping to be able to get the covid-19 vaccine prior to leaving.  2.   Hypertension well controlled.  Continue current medications.  3.   Hyperlipidemia well controlled.  Continue on current medications.      Alexa Mix MD  [facility] AND hospitals    Portions of this dictation were created using the Dragon Nuance voice recognition system. Proofreading was completed but there may be errors in text.

## 2021-01-07 NOTE — NURSING NOTE
"Chief Complaint   Patient presents with     Diabetes       Initial /88   Pulse 73   Temp 97.6  F (36.4  C) (Tympanic)   Resp 18   Wt 66.7 kg (147 lb)   LMP  (LMP Unknown)   SpO2 99%   Breastfeeding No   BMI 25.63 kg/m   Estimated body mass index is 25.63 kg/m  as calculated from the following:    Height as of 10/6/20: 1.613 m (5' 3.5\").    Weight as of this encounter: 66.7 kg (147 lb).  Medication Reconciliation: complete    Candace Hugo LPN  Previous A1C is at goal of <8  Lab Results   Component Value Date    A1C 6.4 10/06/2020    A1C 6.7 10/11/2018     Urine microalbumin:creatine: 10/6/2020  Foot exam -10/06/2020  Eye exam 10/2020    Tobacco User no  Patient is on a daily aspirin  Patient is on a Statin.  Blood pressure today of:     BP Readings from Last 1 Encounters:   01/07/21 132/88      is at the goal of <139/89 for diabetics.    Candace Hugo LPN on 1/7/2021 at 8:30 AM      "

## 2021-01-07 NOTE — LETTER
Oanh Bowen  [address] [facility] [address]    1/7/2021      Dear Ms. Bowen,      I wanted to let you know about your recent labs.  Your A1c was in very good range at 5.8.  Your glucose was 138, which is also good.    Your lipids showed that your LDL is in good range at 91.  As a diabetic, our goal for you is to have a glucose under 100.  For this reason, I would recommend staying on your current dose of Simvastatin.    Please contact us at [phone] with any questions or concerns that you have.    I have attached your lab results for your records.        Sincerely,         Alexa Mix MD     Resulted Orders   Hemoglobin A1c   Result Value Ref Range    Hemoglobin A1C 5.8 4.0 - 6.0 %   Comprehensive metabolic panel   Result Value Ref Range    Sodium 138 134 - 144 mmol/L    Potassium 4.0 3.5 - 5.1 mmol/L    Chloride 103 98 - 107 mmol/L    Carbon Dioxide 28 21 - 31 mmol/L    Anion Gap 7 3 - 14 mmol/L    Glucose 132 (H) 70 - 105 mg/dL    Urea Nitrogen 19 7 - 25 mg/dL    Creatinine 0.94 0.60 - 1.20 mg/dL    GFR Estimate 57 (L) >60 mL/min/[1.73_m2]    GFR Estimate If Black 70 >60 mL/min/[1.73_m2]    Calcium 10.2 8.6 - 10.3 mg/dL    Bilirubin Total 0.6 0.3 - 1.0 mg/dL    Albumin 4.5 3.5 - 5.7 g/dL    Protein Total 7.6 6.4 - 8.9 g/dL    Alkaline Phosphatase 55 34 - 104 U/L    ALT 11 7 - 52 U/L    AST 12 (L) 13 - 39 U/L   Lipid Profile   Result Value Ref Range    Cholesterol 176 <200 mg/dL    Triglycerides 154 (H) <150 mg/dL      Comment:      Borderline high:  150-199 mg/dl  High:             200-499 mg/dl  Very high:       >499 mg/dl      HDL Cholesterol 54 23 - 92 mg/dL    LDL Cholesterol Calculated 91 <100 mg/dL      Comment:      Desirable:       <100 mg/dl    Non HDL Cholesterol 122 <130 mg/dL

## 2021-02-11 ENCOUNTER — IMMUNIZATION (OUTPATIENT)
Dept: FAMILY MEDICINE | Facility: OTHER | Age: 81
End: 2021-02-11
Payer: MEDICARE

## 2021-02-11 PROCEDURE — 91300 PR COVID VAC PFIZER DIL RECON 30 MCG/0.3 ML IM: CPT

## 2021-03-04 ENCOUNTER — IMMUNIZATION (OUTPATIENT)
Dept: FAMILY MEDICINE | Facility: OTHER | Age: 81
End: 2021-03-04
Attending: FAMILY MEDICINE
Payer: MEDICARE

## 2021-03-04 PROCEDURE — 91300 PR COVID VAC PFIZER DIL RECON 30 MCG/0.3 ML IM: CPT

## 2021-04-01 NOTE — TELEPHONE ENCOUNTER
Patient calling from [place]. She states she usually gets her refills through medicare, however they won't transfer. She needs test strips and needles. She is requesting a call to the [facility] pharmacy with a diagnosis code. [facility] pharmacy in [place] phone # is [phone].     Maximiliano Kelley on 4/1/2021 at 4:56 PM

## 2021-04-06 ENCOUNTER — TELEPHONE (OUTPATIENT)
Dept: FAMILY MEDICINE | Facility: OTHER | Age: 81
End: 2021-04-06

## 2021-04-06 DIAGNOSIS — E11.9 TYPE 2 DIABETES MELLITUS WITHOUT COMPLICATION, WITHOUT LONG-TERM CURRENT USE OF INSULIN (H): ICD-10-CM

## 2021-04-06 RX ORDER — LANCING DEVICE
EACH MISCELLANEOUS
Qty: 1 EACH | Refills: 0 | Status: SHIPPED | OUTPATIENT
Start: 2021-04-06

## 2021-04-06 NOTE — TELEPHONE ENCOUNTER
Please call the patient regarding test strips and needles.  She needs them sent to [facility] in [facility].  #  [phone].  They may need to be authorized.  The patient stated they need DX codes.        Anny Payne on 4/6/2021 at 11:25 AM

## 2021-05-17 ENCOUNTER — OFFICE VISIT (OUTPATIENT)
Dept: FAMILY MEDICINE | Facility: OTHER | Age: 81
End: 2021-05-17
Attending: FAMILY MEDICINE
Payer: MEDICARE

## 2021-05-17 VITALS
HEIGHT: 64 IN | WEIGHT: 140 LBS | BODY MASS INDEX: 23.9 KG/M2 | RESPIRATION RATE: 18 BRPM | TEMPERATURE: 97.6 F | HEART RATE: 69 BPM | DIASTOLIC BLOOD PRESSURE: 80 MMHG | OXYGEN SATURATION: 99 % | SYSTOLIC BLOOD PRESSURE: 126 MMHG

## 2021-05-17 DIAGNOSIS — I10 ESSENTIAL HYPERTENSION: ICD-10-CM

## 2021-05-17 DIAGNOSIS — E78.5 HYPERLIPIDEMIA, UNSPECIFIED HYPERLIPIDEMIA TYPE: ICD-10-CM

## 2021-05-17 DIAGNOSIS — Z13.0 SCREENING FOR DEFICIENCY ANEMIA: ICD-10-CM

## 2021-05-17 DIAGNOSIS — E11.9 TYPE 2 DIABETES MELLITUS WITHOUT COMPLICATION, WITHOUT LONG-TERM CURRENT USE OF INSULIN (H): ICD-10-CM

## 2021-05-17 DIAGNOSIS — E11.9 TYPE 2 DIABETES MELLITUS WITHOUT COMPLICATION, WITHOUT LONG-TERM CURRENT USE OF INSULIN (H): Primary | ICD-10-CM

## 2021-05-17 LAB
ALBUMIN SERPL-MCNC: 4.3 G/DL (ref 3.5–5.7)
ALP SERPL-CCNC: 54 U/L (ref 34–104)
ALT SERPL W P-5'-P-CCNC: 11 U/L (ref 7–52)
ANION GAP SERPL CALCULATED.3IONS-SCNC: 7 MMOL/L (ref 3–14)
AST SERPL W P-5'-P-CCNC: 14 U/L (ref 13–39)
BILIRUB SERPL-MCNC: 0.6 MG/DL (ref 0.3–1)
BUN SERPL-MCNC: 16 MG/DL (ref 7–25)
CALCIUM SERPL-MCNC: 10.4 MG/DL (ref 8.6–10.3)
CHLORIDE SERPL-SCNC: 103 MMOL/L (ref 98–107)
CHOLEST SERPL-MCNC: 176 MG/DL
CO2 SERPL-SCNC: 29 MMOL/L (ref 21–31)
CREAT SERPL-MCNC: 0.97 MG/DL (ref 0.6–1.2)
GFR SERPL CREATININE-BSD FRML MDRD: 55 ML/MIN/{1.73_M2}
GLUCOSE SERPL-MCNC: 114 MG/DL (ref 70–105)
HBA1C MFR BLD: 5.5 % (ref 4–6)
HDLC SERPL-MCNC: 68 MG/DL (ref 23–92)
LDLC SERPL CALC-MCNC: 79 MG/DL
NONHDLC SERPL-MCNC: 108 MG/DL
POTASSIUM SERPL-SCNC: 4.1 MMOL/L (ref 3.5–5.1)
PROT SERPL-MCNC: 7.5 G/DL (ref 6.4–8.9)
SODIUM SERPL-SCNC: 139 MMOL/L (ref 134–144)
TRIGL SERPL-MCNC: 145 MG/DL

## 2021-05-17 PROCEDURE — 83036 HEMOGLOBIN GLYCOSYLATED A1C: CPT | Mod: ZL | Performed by: FAMILY MEDICINE

## 2021-05-17 PROCEDURE — 80061 LIPID PANEL: CPT | Mod: ZL | Performed by: FAMILY MEDICINE

## 2021-05-17 PROCEDURE — 36415 COLL VENOUS BLD VENIPUNCTURE: CPT | Mod: ZL | Performed by: FAMILY MEDICINE

## 2021-05-17 PROCEDURE — 80053 COMPREHEN METABOLIC PANEL: CPT | Mod: ZL | Performed by: FAMILY MEDICINE

## 2021-05-17 PROCEDURE — 99213 OFFICE O/P EST LOW 20 MIN: CPT | Performed by: FAMILY MEDICINE

## 2021-05-17 PROCEDURE — G0463 HOSPITAL OUTPT CLINIC VISIT: HCPCS

## 2021-05-17 ASSESSMENT — ENCOUNTER SYMPTOMS
COUGH: 0
SHORTNESS OF BREATH: 0
NERVOUS/ANXIOUS: 0
CHILLS: 0
FEVER: 0

## 2021-05-17 ASSESSMENT — PAIN SCALES - GENERAL: PAINLEVEL: NO PAIN (0)

## 2021-05-17 ASSESSMENT — MIFFLIN-ST. JEOR: SCORE: 1082.1

## 2021-05-17 NOTE — NURSING NOTE
"Chief Complaint   Patient presents with     Diabetes     Previous A1C is at goal of <8  Lab Results   Component Value Date    A1C 5.5 05/17/2021    A1C 5.8 01/07/2021    A1C 6.4 10/06/2020    A1C 6.7 10/11/2018     Urine microalbumin:creatine: 10/06/2020  Foot exam 10/2020  Eye exam 10/2020    Tobacco User NO  Patient is on a daily aspirin  Patient is on a Statin.  Blood pressure today of:     BP Readings from Last 1 Encounters:   05/17/21 126/80      is at the goal of <139/89 for diabetics.    Lulu Moody LPN on 5/17/2021 at 8:27 AM        Initial /80 (BP Location: Right arm, Patient Position: Sitting, Cuff Size: Adult Regular)   Pulse 69   Temp 97.6  F (36.4  C) (Tympanic)   Resp 18   Ht 1.613 m (5' 3.5\")   Wt 63.5 kg (140 lb)   LMP  (LMP Unknown)   SpO2 99%   BMI 24.41 kg/m   Estimated body mass index is 24.41 kg/m  as calculated from the following:    Height as of this encounter: 1.613 m (5' 3.5\").    Weight as of this encounter: 63.5 kg (140 lb).  Medication Reconciliation: complete    Lulu Moody LPN  "

## 2021-05-17 NOTE — PROGRESS NOTES
"  SUBJECTIVE:   Nursing Notes:   Lulu Moody LPN  5/17/2021  8:27 AM  Sign at exiting of workspace  Chief Complaint   Patient presents with     Diabetes     Previous A1C is at goal of <8  Lab Results   Component Value Date    A1C 5.5 05/17/2021    A1C 5.8 01/07/2021    A1C 6.4 10/06/2020    A1C 6.7 10/11/2018     Urine microalbumin:creatine: 10/06/2020  Foot exam 10/2020  Eye exam 10/2020    Tobacco User NO  Patient is on a daily aspirin  Patient is on a Statin.  Blood pressure today of:     BP Readings from Last 1 Encounters:   05/17/21 126/80      is at the goal of <139/89 for diabetics.    Lulu Moody LPN on 5/17/2021 at 8:27 AM        Initial /80 (BP Location: Right arm, Patient Position: Sitting, Cuff Size: Adult Regular)   Pulse 69   Temp 97.6  F (36.4  C) (Tympanic)   Resp 18   Ht 1.613 m (5' 3.5\")   Wt 63.5 kg (140 lb)   LMP  (LMP Unknown)   SpO2 99%   BMI 24.41 kg/m   Estimated body mass index is 24.41 kg/m  as calculated from the following:    Height as of this encounter: 1.613 m (5' 3.5\").    Weight as of this encounter: 63.5 kg (140 lb).  Medication Reconciliation: complete    Lulu Moody LPN      Oanh Bowen is a 81 year old female who presents to clinic today for a diabetic check.    HPI    Frequency of checking blood sugars: Once daily, fasting.  Blood sugar range:  Fasting:  Usually under 130.  Lunch:  n/a  Supper:  n/a  Bedtime:  n/a  Lows under 70?  no  Highs above 200?  no  Symptoms of hypoglycemia?   Very rarely if she hasn't eaten for a while.  Might feel shaky.  Sores on feet?  no  Numbness/Paresthesias?  no  Excessive thirst or urination?   no  Unintended weight loss or gain?  Has lost some, intentionally.  Down 9 lb since 10/2020.  Taking aspirin?   yes  On statin?  Yes, Simvastatin   Taking ACEI/ARB?  Yes, lisinopril   Last eye exam:  10/2020  Eye doctor:  Dr. Perrott  Smoking?  no  Interested in cessation?  n/a      I personally reviewed " medications/allergies/history listed below:    Patient Active Problem List    Diagnosis Date Noted     Type 2 diabetes mellitus without complication, without long-term current use of insulin (H) 01/07/2021     Priority: Medium     Hyperlipidemia, unspecified hyperlipidemia type 10/11/2018     Priority: Medium     Essential hypertension 10/11/2018     Priority: Medium     Hyperglycemia 10/11/2018     Priority: Medium     Past Medical History:   Diagnosis Date     Cataract     inactive icd-9 diagnosis auto replaced with icd-10, display name retained//mporz     Essential (primary) hypertension     No Comments Provided     Hyperlipidemia     No Comments Provided     Osteoarthritis     No Comments Provided     Personal history of other diseases of the female genital tract     Child birth x 3      Past Surgical History:   Procedure Laterality Date     COLONOSCOPY      10/24/2013,diverticulosis-no further colonoscopy rec d/t age     OTHER SURGICAL HISTORY      500189,OTHER,Bilateral tubal ligation     Family History   Problem Relation Age of Onset     Cancer Mother         Cancer, lung cancer     Other - See Comments Father 70        Stroke     Breast Cancer No family hx of         Cancer-breast     Social History     Tobacco Use     Smoking status: Never Smoker     Smokeless tobacco: Never Used   Substance Use Topics     Alcohol use: Yes     Social History     Social History Narrative    Nonsmoker.  Occasional alcohol.    .    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     Three grown children.      Now retired.  [employer]  in [place].     Current Outpatient Medications   Medication Sig Dispense Refill     aspirin 81 MG tablet Take 81 mg by mouth daily With a meal       blood glucose (NO BRAND SPECIFIED) lancets standard Use to test blood sugar 1 time daily. Dispense as insurance allows. Dx. Code: E11.9. 100 each 11     blood glucose (NO BRAND SPECIFIED) lancing device Device to be used with  "lancets.  Diagnosis E11.9. 1 each 0     blood glucose (NO BRAND SPECIFIED) test strip Use to test blood sugar 1 time daily. Dispense as insurance allows. Dx. Code: E11.9. 100 strip 4     blood glucose monitoring (NO BRAND SPECIFIED) meter device kit Use to test blood sugar 1 time daily. Dispense as insurance allows. Dx. Code: E11.9. 1 kit 0     Blood Glucose Monitoring Suppl (ONETOUCH VERIO FLEX SYSTEM) w/Device KIT USE TO CHECK GLUCOSE ONCE DAILY       Lancets (ONETOUCH DELICA PLUS [id number]) MISC USE TO CHECK GLUCOSE ONCE DAILY       lisinopril (ZESTRIL) 10 MG tablet Take 1 tablet (10 mg) by mouth daily 90 tablet 3     Multiple Vitamins-Iron (MULTIVITAMIN/IRON PO)        simvastatin (ZOCOR) 10 MG tablet Take 1 tablet (10 mg) by mouth At Bedtime 90 tablet 3     No Known Allergies    Review of Systems   Constitutional: Negative for chills and fever.   Respiratory: Negative for cough and shortness of breath.    Cardiovascular: Negative for peripheral edema.   Psychiatric/Behavioral: Negative for mood changes. The patient is not nervous/anxious.         OBJECTIVE:     /80 (BP Location: Right arm, Patient Position: Sitting, Cuff Size: Adult Regular)   Pulse 69   Temp 97.6  F (36.4  C) (Tympanic)   Resp 18   Ht 1.613 m (5' 3.5\")   Wt 63.5 kg (140 lb)   LMP  (LMP Unknown)   SpO2 99%   BMI 24.41 kg/m    Body mass index is 24.41 kg/m .  Physical Exam  Constitutional:       Appearance: She is well-developed.   HENT:      Head: Normocephalic.   Eyes:      Pupils: Pupils are equal, round, and reactive to light.   Neck:      Musculoskeletal: Normal range of motion.   Cardiovascular:      Rate and Rhythm: Normal rate and regular rhythm.      Heart sounds: Normal heart sounds. No murmur.   Pulmonary:      Effort: Pulmonary effort is normal. No respiratory distress.      Breath sounds: Normal breath sounds. No wheezing or rales.   Skin:     General: Skin is warm.      Comments: No lesions on feet.   Neurological:    "   Mental Status: She is alert and oriented to person, place, and time.      Comments: Normal sensation with monofilament exam bilaterally.           PHQ-9 SCORE 10/20/2016   PHQ-9 Total Score 0       PHQ-2 Score:     PHQ-2 ( 1999 Pfizer) 5/17/2021 1/7/2021   Q1: Little interest or pleasure in doing things 0 0   Q2: Feeling down, depressed or hopeless 0 0   PHQ-2 Score 0 0         I personally reviewed results withpatient as listed below:   Diagnostic Test Results:  Results for orders placed or performed in visit on 05/17/21 (from the past 24 hour(s))   Hemoglobin A1c   Result Value Ref Range    Hemoglobin A1C 5.5 4.0 - 6.0 %   Comprehensive metabolic panel   Result Value Ref Range    Sodium 139 134 - 144 mmol/L    Potassium 4.1 3.5 - 5.1 mmol/L    Chloride 103 98 - 107 mmol/L    Carbon Dioxide 29 21 - 31 mmol/L    Anion Gap 7 3 - 14 mmol/L    Glucose 114 (H) 70 - 105 mg/dL    Urea Nitrogen 16 7 - 25 mg/dL    Creatinine 0.97 0.60 - 1.20 mg/dL    GFR Estimate 55 (L) >60 mL/min/[1.73_m2]    GFR Estimate If Black 67 >60 mL/min/[1.73_m2]    Calcium 10.4 (H) 8.6 - 10.3 mg/dL    Bilirubin Total 0.6 0.3 - 1.0 mg/dL    Albumin 4.3 3.5 - 5.7 g/dL    Protein Total 7.5 6.4 - 8.9 g/dL    Alkaline Phosphatase 54 34 - 104 U/L    ALT 11 7 - 52 U/L    AST 14 13 - 39 U/L   Lipid Profile   Result Value Ref Range    Cholesterol 176 <200 mg/dL    Triglycerides 145 <150 mg/dL    HDL Cholesterol 68 23 - 92 mg/dL    LDL Cholesterol Calculated 79 <100 mg/dL    Non HDL Cholesterol 108 <130 mg/dL         ASSESSMENT/PLAN:       ICD-10-CM    1. Type 2 diabetes mellitus without complication, without long-term current use of insulin (H)  E11.9    2. Essential hypertension  I10    3. Hyperlipidemia, unspecified hyperlipidemia type  E78.5        1. Stable.  A1c in good range.  Blood pressure is well controlled. Eye exam is up to date.  On aspirin daily.  Non-smoker.  On ACE-I and statin.  Follow up in 5-6 months.  2.   Hypertension well controlled.   Continue current medications.  3.   Hyperlipidemia well controlled.  Continue on current medications.      Alexa Mix MD  [facility] AND [facility]    Portions of this dictation were created using the Dragon Nuance voice recognition system. Proofreading was completed but there may be errors in text.

## 2021-10-07 ENCOUNTER — LAB (OUTPATIENT)
Dept: LAB | Facility: OTHER | Age: 81
End: 2021-10-07
Attending: FAMILY MEDICINE
Payer: MEDICARE

## 2021-10-07 ENCOUNTER — IMMUNIZATION (OUTPATIENT)
Dept: FAMILY MEDICINE | Facility: OTHER | Age: 81
End: 2021-10-07
Attending: FAMILY MEDICINE
Payer: MEDICARE

## 2021-10-07 VITALS
RESPIRATION RATE: 18 BRPM | BODY MASS INDEX: 24.41 KG/M2 | TEMPERATURE: 97.5 F | OXYGEN SATURATION: 100 % | WEIGHT: 143 LBS | HEIGHT: 64 IN | HEART RATE: 68 BPM | SYSTOLIC BLOOD PRESSURE: 138 MMHG | DIASTOLIC BLOOD PRESSURE: 74 MMHG

## 2021-10-07 DIAGNOSIS — I10 ESSENTIAL HYPERTENSION: ICD-10-CM

## 2021-10-07 DIAGNOSIS — E78.5 HYPERLIPIDEMIA, UNSPECIFIED HYPERLIPIDEMIA TYPE: ICD-10-CM

## 2021-10-07 DIAGNOSIS — Z00.00 ENCOUNTER FOR MEDICARE ANNUAL WELLNESS EXAM: Primary | ICD-10-CM

## 2021-10-07 DIAGNOSIS — Z13.0 SCREENING FOR DEFICIENCY ANEMIA: ICD-10-CM

## 2021-10-07 DIAGNOSIS — Z23 NEEDS FLU SHOT: ICD-10-CM

## 2021-10-07 DIAGNOSIS — E11.9 TYPE 2 DIABETES MELLITUS WITHOUT COMPLICATION, WITHOUT LONG-TERM CURRENT USE OF INSULIN (H): ICD-10-CM

## 2021-10-07 LAB
ALBUMIN SERPL-MCNC: 4.1 G/DL (ref 3.5–5.7)
ALP SERPL-CCNC: 50 U/L (ref 34–104)
ALT SERPL W P-5'-P-CCNC: 10 U/L (ref 7–52)
ANION GAP SERPL CALCULATED.3IONS-SCNC: 6 MMOL/L (ref 3–14)
AST SERPL W P-5'-P-CCNC: 13 U/L (ref 13–39)
BASOPHILS # BLD AUTO: 0.1 10E3/UL (ref 0–0.2)
BASOPHILS NFR BLD AUTO: 1 %
BILIRUB SERPL-MCNC: 0.6 MG/DL (ref 0.3–1)
BUN SERPL-MCNC: 21 MG/DL (ref 7–25)
CALCIUM SERPL-MCNC: 9.4 MG/DL (ref 8.6–10.3)
CHLORIDE BLD-SCNC: 105 MMOL/L (ref 98–107)
CHOLEST SERPL-MCNC: 156 MG/DL
CO2 SERPL-SCNC: 28 MMOL/L (ref 21–31)
CREAT SERPL-MCNC: 0.99 MG/DL (ref 0.6–1.2)
EOSINOPHIL # BLD AUTO: 0.2 10E3/UL (ref 0–0.7)
EOSINOPHIL NFR BLD AUTO: 3 %
ERYTHROCYTE [DISTWIDTH] IN BLOOD BY AUTOMATED COUNT: 12 % (ref 10–15)
FASTING STATUS PATIENT QL REPORTED: NORMAL
GFR SERPL CREATININE-BSD FRML MDRD: 54 ML/MIN/1.73M2
GLUCOSE BLD-MCNC: 103 MG/DL (ref 70–105)
HBA1C MFR BLD: 5.5 % (ref 4–6.2)
HCT VFR BLD AUTO: 36.7 % (ref 35–47)
HDLC SERPL-MCNC: 57 MG/DL (ref 23–92)
HGB BLD-MCNC: 12.5 G/DL (ref 11.7–15.7)
IMM GRANULOCYTES # BLD: 0 10E3/UL
IMM GRANULOCYTES NFR BLD: 0 %
LDLC SERPL CALC-MCNC: 75 MG/DL
LYMPHOCYTES # BLD AUTO: 1.9 10E3/UL (ref 0.8–5.3)
LYMPHOCYTES NFR BLD AUTO: 32 %
MCH RBC QN AUTO: 31.6 PG (ref 26.5–33)
MCHC RBC AUTO-ENTMCNC: 34.1 G/DL (ref 31.5–36.5)
MCV RBC AUTO: 93 FL (ref 78–100)
MONOCYTES # BLD AUTO: 0.8 10E3/UL (ref 0–1.3)
MONOCYTES NFR BLD AUTO: 13 %
NEUTROPHILS # BLD AUTO: 3.1 10E3/UL (ref 1.6–8.3)
NEUTROPHILS NFR BLD AUTO: 51 %
NONHDLC SERPL-MCNC: 99 MG/DL
NRBC # BLD AUTO: 0 10E3/UL
NRBC BLD AUTO-RTO: 0 /100
PLATELET # BLD AUTO: 219 10E3/UL (ref 150–450)
POTASSIUM BLD-SCNC: 4.1 MMOL/L (ref 3.5–5.1)
PROT SERPL-MCNC: 6.9 G/DL (ref 6.4–8.9)
RBC # BLD AUTO: 3.96 10E6/UL (ref 3.8–5.2)
SODIUM SERPL-SCNC: 139 MMOL/L (ref 134–144)
TRIGL SERPL-MCNC: 120 MG/DL
TSH SERPL DL<=0.005 MIU/L-ACNC: 2.74 MU/L (ref 0.4–4)
WBC # BLD AUTO: 6.1 10E3/UL (ref 4–11)

## 2021-10-07 PROCEDURE — 84443 ASSAY THYROID STIM HORMONE: CPT | Mod: ZL

## 2021-10-07 PROCEDURE — 90662 IIV NO PRSV INCREASED AG IM: CPT

## 2021-10-07 PROCEDURE — 85025 COMPLETE CBC W/AUTO DIFF WBC: CPT | Mod: ZL

## 2021-10-07 PROCEDURE — 80061 LIPID PANEL: CPT | Mod: ZL

## 2021-10-07 PROCEDURE — 83036 HEMOGLOBIN GLYCOSYLATED A1C: CPT | Mod: ZL

## 2021-10-07 PROCEDURE — G0439 PPPS, SUBSEQ VISIT: HCPCS | Performed by: FAMILY MEDICINE

## 2021-10-07 PROCEDURE — 91300 PR COVID VAC PFIZER DIL RECON 30 MCG/0.3 ML IM: CPT

## 2021-10-07 PROCEDURE — G0008 ADMIN INFLUENZA VIRUS VAC: HCPCS

## 2021-10-07 PROCEDURE — 36415 COLL VENOUS BLD VENIPUNCTURE: CPT | Mod: ZL

## 2021-10-07 PROCEDURE — 80053 COMPREHEN METABOLIC PANEL: CPT | Mod: ZL

## 2021-10-07 RX ORDER — SIMVASTATIN 10 MG
10 TABLET ORAL AT BEDTIME
Qty: 90 TABLET | Refills: 3 | Status: SHIPPED | OUTPATIENT
Start: 2021-10-07 | End: 2021-10-07

## 2021-10-07 RX ORDER — LISINOPRIL 10 MG/1
10 TABLET ORAL DAILY
Qty: 90 TABLET | Refills: 3 | Status: SHIPPED | OUTPATIENT
Start: 2021-10-07 | End: 2022-10-03

## 2021-10-07 RX ORDER — LISINOPRIL 10 MG/1
10 TABLET ORAL DAILY
Qty: 90 TABLET | Refills: 3 | Status: SHIPPED | OUTPATIENT
Start: 2021-10-07 | End: 2021-10-07

## 2021-10-07 RX ORDER — SIMVASTATIN 10 MG
10 TABLET ORAL AT BEDTIME
Qty: 90 TABLET | Refills: 3 | Status: SHIPPED | OUTPATIENT
Start: 2021-10-07 | End: 2022-10-03

## 2021-10-07 ASSESSMENT — ACTIVITIES OF DAILY LIVING (ADL): CURRENT_FUNCTION: TRANSPORTATION REQUIRES ASSISTANCE

## 2021-10-07 ASSESSMENT — MIFFLIN-ST. JEOR: SCORE: 1095.7

## 2021-10-07 ASSESSMENT — PAIN SCALES - GENERAL: PAINLEVEL: NO PAIN (0)

## 2021-10-07 NOTE — LETTER
Oanh BRENNA Bowen  [address] [facility] [address]    10/7/2021      Dear Ms. Bowen,      I wanted to let you know about your recent labs.  Your glomerular filtration rate (GFR), an overall assessment of your kidney function, was mildly decreased.  This is stable and is fairly common for your age.  Avoid excessive use of NSAIDs like ibuprofen and naproxen to protect your kidneys and prevent worsening of your renal function.    All of your other labs were in good range.    Please contact us at [phone] with any questions or concerns that you have.    I have attached your lab results for your records.        Sincerely,         Alexa Mix MD     Resulted Orders   Lipid Profile   Result Value Ref Range    Cholesterol 156 <200 mg/dL    Triglycerides 120 <150 mg/dL    Direct Measure HDL 57 23 - 92 mg/dL    LDL Cholesterol Calculated 75 <=100 mg/dL    Non HDL Cholesterol 99 <130 mg/dL    Patient Fasting > 8hrs? Unknown     Narrative    Cholesterol  Desirable:  <200 mg/dL    Triglycerides  Normal:  Less than 150 mg/dL  Borderline High:  150-199 mg/dL  High:  200-499 mg/dL  Very High:  Greater than or equal to 500 mg/dL    Direct Measure HDL  Female:  Greater than or equal to 50 mg/dL   Male:  Greater than or equal to 40 mg/dL    LDL Cholesterol  Desirable:  <100mg/dL  Above Desirable:  100-129 mg/dL   Borderline High:  130-159 mg/dL   High:  160-189 mg/dL   Very High:  >= 190 mg/dL    Non HDL Cholesterol  Desirable:  130 mg/dL  Above Desirable:  130-159 mg/dL  Borderline High:  160-189 mg/dL  High:  190-219 mg/dL  Very High:  Greater than or equal to 220 mg/dL   TSH with free T4 reflex   Result Value Ref Range    TSH 2.74 0.40 - 4.00 mU/L   Hemoglobin A1c   Result Value Ref Range    Hemoglobin A1C 5.5 4.0 - 6.2 %   Comprehensive metabolic panel   Result Value Ref Range    Sodium 139 134 - 144 mmol/L    Potassium 4.1 3.5 - 5.1 mmol/L    Chloride 105 98 - 107 mmol/L    Carbon Dioxide (CO2) 28  21 - 31 mmol/L    Anion Gap 6 3 - 14 mmol/L    Urea Nitrogen 21 7 - 25 mg/dL    Creatinine 0.99 0.60 - 1.20 mg/dL    Calcium 9.4 8.6 - 10.3 mg/dL    Glucose 103 70 - 105 mg/dL    Alkaline Phosphatase 50 34 - 104 U/L    AST 13 13 - 39 U/L    ALT 10 7 - 52 U/L    Protein Total 6.9 6.4 - 8.9 g/dL    Albumin 4.1 3.5 - 5.7 g/dL    Bilirubin Total 0.6 0.3 - 1.0 mg/dL    GFR Estimate 54 (L) >60 mL/min/1.73m2      Comment:      As of July 11, 2021, eGFR is calculated by the CKD-EPI creatinine equation, without race adjustment. eGFR can be influenced by muscle mass, exercise, and diet. The reported eGFR is an estimation only and is only applicable if the renal function is stable.   CBC with platelets and differential   Result Value Ref Range    WBC Count 6.1 4.0 - 11.0 10e3/uL    RBC Count 3.96 3.80 - 5.20 10e6/uL    Hemoglobin 12.5 11.7 - 15.7 g/dL    Hematocrit 36.7 35.0 - 47.0 %    MCV 93 78 - 100 fL    MCH 31.6 26.5 - 33.0 pg    MCHC 34.1 31.5 - 36.5 g/dL    RDW 12.0 10.0 - 15.0 %    Platelet Count 219 150 - 450 10e3/uL    % Neutrophils 51 %    % Lymphocytes 32 %    % Monocytes 13 %    % Eosinophils 3 %    % Basophils 1 %    % Immature Granulocytes 0 %    NRBCs per 100 WBC 0 <1 /100    Absolute Neutrophils 3.1 1.6 - 8.3 10e3/uL    Absolute Lymphocytes 1.9 0.8 - 5.3 10e3/uL    Absolute Monocytes 0.8 0.0 - 1.3 10e3/uL    Absolute Eosinophils 0.2 0.0 - 0.7 10e3/uL    Absolute Basophils 0.1 0.0 - 0.2 10e3/uL    Absolute Immature Granulocytes 0.0 <=0.0 10e3/uL    Absolute NRBCs 0.0 10e3/uL

## 2021-10-07 NOTE — PATIENT INSTRUCTIONS
Patient Education   Personalized Prevention Plan  You are due for the preventive services outlined below.  Your care team is available to assist you in scheduling these services.  If you have already completed any of these items, please share that information with your care team to update in your medical record.  Health Maintenance Due   Topic Date Due     Osteoporosis Screening  Never done     Diabetic Foot Exam  Never done     Zoster (Shingles) Vaccine (2 of 3) 11/27/2014     Flu Vaccine (1) 09/01/2021     Kidney Microalbumin Urine Test  10/06/2021     FALL RISK ASSESSMENT  10/06/2021

## 2021-10-07 NOTE — PROGRESS NOTES
"SUBJECTIVE:   Oanh Bowen is a 81 year old female who presents for Preventive Visit.      Patient has been advised of split billing requirements and indicates understanding: Yes   Are you in the first 12 months of your Medicare coverage?  No    Healthy Habits:     In general, how would you rate your overall health?  Good    Duration of exercise:  Less than 15 minutes    Do you usually eat at least 4 servings of fruit and vegetables a day, include whole grains    & fiber and avoid regularly eating high fat or \"junk\" foods?  Yes    Ability to successfully perform activities of daily living:  Transportation requires assistance    Home Safety:  Throw rugs in the hallway    Hearing Impairment:  No hearing concerns    In the past 6 months, have you been bothered by leaking of urine?  No    In general, how would you rate your overall mental or emotional health?  Good      PHQ-2 Total Score: 0    Additional concerns today:  No    Diabetes history:  Frequency of checking blood sugars: once a day, fasting  Blood sugar range:  Fasting:  Under 120  Lunch:  n/a  Supper:  n/a  Bedtime:  n/a  Lows under 70?  no  Highs above 200?  no  Symptoms of hypoglycemia?   no  Sores on feet?  no  Numbness/Paresthesias?  no  Excessive thirst or urination?   no  Unintended weight loss or gain?  no  Taking aspirin?   yes  On statin?  Yes, Simvastatin   Taking ACEI/ARB?  Yes, lisinopril   Last eye exam:  11/10/2020  Eye doctor:  [facility]  Smoking?  no  Interested in cessation?  n/a      Do you feel safe in your environment? Yes    Have you ever done Advance Care Planning? (For example, a Health Directive, POLST, or a discussion with a medical provider or your loved ones about your wishes): Yes, patient states has an Advance Care Planning document and will bring a copy to the clinic.       Fall risk  Fallen 2 or more times in the past year?: No  Any fall with injury in the past year?: No    Cognitive Screening   1) Repeat 3 items " (Leader, Season, Table)    2) Clock draw: NORMAL  3) 3 item recall: Recalls 3 objects  Results: 3 items recalled: COGNITIVE IMPAIRMENT LESS LIKELY    Mini-CogTM Copyright KIAN Beck. Licensed by the author for use in [facility]; reprinted with permission (samantha@[facility]). All rights reserved.      Do you have sleep apnea, excessive snoring or daytime drowsiness?: yes    Reviewed and updated as needed this visit by clinical staff  Tobacco  Allergies  Meds   Med Hx  Surg Hx  Fam Hx  Soc Hx        Reviewed and updated as needed this visit by Provider                Social History     Tobacco Use     Smoking status: Never Smoker     Smokeless tobacco: Never Used   Substance Use Topics     Alcohol use: Yes         Alcohol Use 10/7/2021   Prescreen: >3 drinks/day or >7 drinks/week? No               Current providers sharing in care for this patient include:   Patient Care Team:  Alexa Mix MD as PCP - General (Family Medicine)  Alexa Mix MD as Assigned PCP  Kylie Tse RN as Diabetes Educator (Diabetes Education)    The following health maintenance items are reviewed in Epic and correct as of today:  Health Maintenance Due   Topic Date Due     DEXA  Never done     DIABETIC FOOT EXAM  Never done     ZOSTER IMMUNIZATION (2 of 3) 11/27/2014     MICROALBUMIN  10/06/2021     FALL RISK ASSESSMENT  10/06/2021     BP Readings from Last 3 Encounters:   10/07/21 138/74   05/17/21 126/80   01/07/21 132/88    Wt Readings from Last 3 Encounters:   10/07/21 64.9 kg (143 lb)   05/17/21 63.5 kg (140 lb)   01/07/21 66.7 kg (147 lb)                  Patient Active Problem List   Diagnosis     Hyperlipidemia, unspecified hyperlipidemia type     Essential hypertension     Hyperglycemia     Type 2 diabetes mellitus without complication, without long-term current use of insulin (H)     Past Surgical History:   Procedure Laterality Date     COLONOSCOPY      10/24/2013,diverticulosis-no further  colonoscopy rec d/t age     OTHER SURGICAL HISTORY      430012,OTHER,Bilateral tubal ligation       Social History     Tobacco Use     Smoking status: Never Smoker     Smokeless tobacco: Never Used   Substance Use Topics     Alcohol use: Yes     Family History   Problem Relation Age of Onset     Cancer Mother         Cancer, lung cancer     Other - See Comments Father 70        Stroke     Breast Cancer No family hx of         Cancer-breast         Current Outpatient Medications   Medication Sig Dispense Refill     aspirin 81 MG tablet Take 81 mg by mouth daily With a meal       blood glucose (NO BRAND SPECIFIED) lancets standard Use to test blood sugar 1 time daily. Dispense as insurance allows. Dx. Code: E11.9. 100 each 11     blood glucose (NO BRAND SPECIFIED) lancing device Device to be used with lancets.  Diagnosis E11.9. 1 each 0     blood glucose (NO BRAND SPECIFIED) test strip Use to test blood sugar 1 time daily. Dispense as insurance allows. Dx. Code: E11.9. 100 strip 11     blood glucose monitoring (NO BRAND SPECIFIED) meter device kit Use to test blood sugar 1 time daily. Dispense as insurance allows. Dx. Code: E11.9. 1 kit 0     Blood Glucose Monitoring Suppl (ONETOUCH VERIO FLEX SYSTEM) w/Device KIT USE TO CHECK GLUCOSE ONCE DAILY       Lancets (ONETOUCH DELICA PLUS [id number]) MISC USE TO CHECK GLUCOSE ONCE DAILY       lisinopril (ZESTRIL) 10 MG tablet Take 1 tablet (10 mg) by mouth daily 90 tablet 3     Multiple Vitamins-Iron (MULTIVITAMIN/IRON PO)        simvastatin (ZOCOR) 10 MG tablet Take 1 tablet (10 mg) by mouth At Bedtime 90 tablet 3     No Known Allergies  Recent Labs   Lab Test 10/07/21  0732 05/17/21  0735 01/07/21  0856 01/07/21  0856   A1C 5.5 5.5  --  5.8   LDL 75 79  --  91   HDL 57 68  --  54   TRIG 120 145  --  154*   ALT 10 11  --  11   CR 0.99 0.97   < > 0.94   GFRESTIMATED 54* 55*   < > 57*   GFRESTBLACK  --  67  --  70   POTASSIUM 4.1 4.1   < > 4.0    < > = values in this interval  "not displayed.            Pertinent mammograms are reviewed under the imaging tab.    Review of Systems  Constitutional, HEENT, cardiovascular, pulmonary, GI, , musculoskeletal, neuro, skin, endocrine and psych systems are negative, except as otherwise noted.    OBJECTIVE:   /74 (BP Location: Right arm, Patient Position: Sitting, Cuff Size: Adult Regular)   Pulse 68   Temp 97.5  F (36.4  C) (Tympanic)   Resp 18   Ht 1.613 m (5' 3.5\")   Wt 64.9 kg (143 lb)   LMP  (LMP Unknown)   SpO2 100%   BMI 24.93 kg/m   Estimated body mass index is 24.93 kg/m  as calculated from the following:    Height as of this encounter: 1.613 m (5' 3.5\").    Weight as of this encounter: 64.9 kg (143 lb).  Physical Exam  GENERAL APPEARANCE: healthy, alert and no distress  EYES: Eyes grossly normal to inspection, PERRL and conjunctivae and sclerae normal  HENT: ear canals and TM's normal, nose and mouth without ulcers or lesions, oropharynx clear and oral mucous membranes moist  NECK: no adenopathy, no asymmetry, masses, or scars and thyroid normal to palpation  RESP: lungs clear to auscultation - no rales, rhonchi or wheezes  BREAST: normal without masses, tenderness or nipple discharge and no palpable axillary masses or adenopathy  CV: regular rate and rhythm, normal S1 S2, no S3 or S4, no murmur, click or rub, no peripheral edema and peripheral pulses strong  ABDOMEN: soft, nontender, no hepatosplenomegaly, no masses and bowel sounds normal  MS: no musculoskeletal defects are noted and gait is age appropriate without ataxia  SKIN: no suspicious lesions or rashes  NEURO: Normal strength and tone, sensory exam grossly normal, mentation intact and speech normal  DIABETIC FOOT EXAM: normal DP and PT pulses, no trophic changes or ulcerative lesions and normal sensory exam  PSYCH: mentation appears normal and affect normal/bright    Diagnostic Test Results:  Labs reviewed in Epic  Results for orders placed or performed in visit on " 10/07/21 (from the past 24 hour(s))   CBC with platelets differential    Narrative    The following orders were created for panel order CBC with platelets differential.  Procedure                               Abnormality         Status                     ---------                               -----------         ------                     CBC with platelets and d...[[id number]]                      Final result                 Please view results for these tests on the individual orders.   Hemoglobin A1c   Result Value Ref Range    Hemoglobin A1C 5.5 4.0 - 6.2 %   CBC with platelets and differential   Result Value Ref Range    WBC Count 6.1 4.0 - 11.0 10e3/uL    RBC Count 3.96 3.80 - 5.20 10e6/uL    Hemoglobin 12.5 11.7 - 15.7 g/dL    Hematocrit 36.7 35.0 - 47.0 %    MCV 93 78 - 100 fL    MCH 31.6 26.5 - 33.0 pg    MCHC 34.1 31.5 - 36.5 g/dL    RDW 12.0 10.0 - 15.0 %    Platelet Count 219 150 - 450 10e3/uL    % Neutrophils 51 %    % Lymphocytes 32 %    % Monocytes 13 %    % Eosinophils 3 %    % Basophils 1 %    % Immature Granulocytes 0 %    NRBCs per 100 WBC 0 <1 /100    Absolute Neutrophils 3.1 1.6 - 8.3 10e3/uL    Absolute Lymphocytes 1.9 0.8 - 5.3 10e3/uL    Absolute Monocytes 0.8 0.0 - 1.3 10e3/uL    Absolute Eosinophils 0.2 0.0 - 0.7 10e3/uL    Absolute Basophils 0.1 0.0 - 0.2 10e3/uL    Absolute Immature Granulocytes 0.0 <=0.0 10e3/uL    Absolute NRBCs 0.0 10e3/uL       ASSESSMENT / PLAN:       ICD-10-CM    1. Encounter for Medicare annual wellness exam  Z00.00    2. Type 2 diabetes mellitus without complication, without long-term current use of insulin (H)  E11.9 blood glucose (NO BRAND SPECIFIED) lancets standard     blood glucose (NO BRAND SPECIFIED) test strip     lisinopril (ZESTRIL) 10 MG tablet     DISCONTINUED: lisinopril (ZESTRIL) 10 MG tablet     DISCONTINUED: blood glucose (NO BRAND SPECIFIED) test strip     DISCONTINUED: blood glucose (NO BRAND SPECIFIED) lancets standard   3. Essential  "hypertension  I10 lisinopril (ZESTRIL) 10 MG tablet     DISCONTINUED: lisinopril (ZESTRIL) 10 MG tablet   4. Hyperlipidemia, unspecified hyperlipidemia type  E78.5 simvastatin (ZOCOR) 10 MG tablet     DISCONTINUED: simvastatin (ZOCOR) 10 MG tablet   5. Needs flu shot  Z23 INFLUENZA, QUAD, HIGH DOSE, PF, 65YR + (FLUZONE HD)     1.  Mammogram last completed 11/4/2020.  She has been doing these every other year.  DEXA and abdominal aortic aneurysm screening declined by patient.  Colonoscopy last completed 10/24/13 and was normal.  No further follow up planned due to age.  Pap Smear deferred due to age >65.  Tdap last completed 10/21/19.  Declines shingrix for now, might get it later.  Flu shot updated today.  She is getting covid booster today as well.  prevnar and pneumovax are up to date.  2.  Stable.  A1c in good range.  Blood pressure is well controlled. Eye exam is up to date.  On aspirin daily.  Non-smoker.  On ACE-I and statin.  Follow up in 6 months with fasting labs prior to seeing me.  She was unable to leave a urine microalbumin/creatinine ratio sample today, will plan to do this next visit.  3.  Blood pressure stable.  Medications refilled as above.  4.  Simvastatin refilled.    5.  Flu shot updated today as well.    Patient has been advised of split billing requirements and indicates understanding: Yes  COUNSELING:  Reviewed preventive health counseling, as reflected in patient instructions       Regular exercise       Healthy diet/nutrition       Vision screening       Aspirin prophylaxis        Osteoporosis prevention/bone health       Colon cancer screening    Estimated body mass index is 24.93 kg/m  as calculated from the following:    Height as of this encounter: 1.613 m (5' 3.5\").    Weight as of this encounter: 64.9 kg (143 lb).        She reports that she has never smoked. She has never used smokeless tobacco.      Appropriate preventive services were discussed with this patient, including " applicable screening as appropriate for cardiovascular disease, diabetes, osteopenia/osteoporosis, and glaucoma.  As appropriate for age/gender, discussed screening for colorectal cancer, prostate cancer, breast cancer, and cervical cancer. Checklist reviewing preventive services available has been given to the patient.    Reviewed patients plan of care and provided an AVS. The Basic Care Plan (routine screening as documented in Health Maintenance) for Oanh meets the Care Plan requirement. This Care Plan has been established and reviewed with the Patient.    Counseling Resources:  ATP IV Guidelines  Pooled Cohorts Equation Calculator  Breast Cancer Risk Calculator  Breast Cancer: Medication to Reduce Risk  FRAX Risk Assessment  ICSI Preventive Guidelines  Dietary Guidelines for Americans, 2010  USDA's MyPlate  ASA Prophylaxis  Lung CA Screening    Alexa Mix MD  [facility]    Identified Health Risks:

## 2021-10-07 NOTE — NURSING NOTE
"Patient here for Wellness and diabetes mellitus check up.   Previous A1C is at goal of <8  Lab Results   Component Value Date    A1C 5.5 10/07/2021    A1C 5.5 05/17/2021    A1C 5.8 01/07/2021    A1C 6.4 10/06/2020    A1C 6.7 10/11/2018     Urine microalbumin:creatine: 10/6/20  Foot exam 5/17/21  Eye exam 11/10/20    Tobacco User no  Patient is on a daily aspirin  Patient is on a Statin.  Blood pressure today of:     BP Readings from Last 1 Encounters:   10/07/21 138/74      is at the goal of <139/89 for diabetics.    Shereen Jordan LPN on 10/7/2021 at 8:20 AM    Chief Complaint   Patient presents with     Wellness Visit     DM, flu shot       Initial /74 (BP Location: Right arm, Patient Position: Sitting, Cuff Size: Adult Regular)   Pulse 68   Temp 97.5  F (36.4  C) (Tympanic)   Resp 18   Ht 1.613 m (5' 3.5\")   Wt 64.9 kg (143 lb)   LMP  (LMP Unknown)   SpO2 100%   BMI 24.93 kg/m   Estimated body mass index is 24.93 kg/m  as calculated from the following:    Height as of this encounter: 1.613 m (5' 3.5\").    Weight as of this encounter: 64.9 kg (143 lb).  Medication Reconciliation: complete    Shereen Jordan LPN    Advance Care Directive reviewed    "

## 2022-03-30 ENCOUNTER — TELEPHONE (OUTPATIENT)
Dept: FAMILY MEDICINE | Facility: OTHER | Age: 82
End: 2022-03-30
Payer: MEDICARE

## 2022-03-30 NOTE — TELEPHONE ENCOUNTER
Patient coming in for her diabetic check.  There are future orders pending. Did call patient and transfer her to the appointment line.  Briana Subramanian LPN, LPN  3/30/2022  4:16 PM

## 2022-03-30 NOTE — TELEPHONE ENCOUNTER
Patient would like an order to have her labs before her 04/28 appointment.       Cris Lang on 3/30/2022 at 3:27 PM     30

## 2022-04-25 NOTE — PROGRESS NOTES
Nursing Notes:   Lesley Rawls LPN  4/28/2022  8:30 AM  Signed  Chief Complaint   Patient presents with     Diabetes     Previous A1C is at goal of <8  Lab Results   Component Value Date    A1C 5.7 04/28/2022    A1C 5.5 10/07/2021    A1C 5.5 05/17/2021    A1C 5.8 01/07/2021    A1C 6.4 10/06/2020    A1C 6.7 10/11/2018     Urine microalbumin:creatine: 4/28/2022  Foot exam 10/27/2021  Eye exam DUE    Tobacco User NO  Patient is on a daily aspirin  Patient is on a Statin.  Blood pressure today of:     BP Readings from Last 1 Encounters:   04/28/22 136/80      is at the goal of <139/89 for diabetics.      Medication Reconciliation: don Rawls LPN'      Amol Hugo is a 82 year old who presents for the following health issues     History of Present Illness       Diabetes:   She presents for follow up of diabetes.  She is checking home blood glucose one time daily. She checks blood glucose before meals.  Blood glucose is never over 200 and never under 70. When her blood glucose is low, the patient is asymptomatic for confusion, blurred vision, lethargy and reports not feeling dizzy, shaky, or weak.  She has no concerns regarding her diabetes at this time.  She is not experiencing numbness or burning in feet, excessive thirst, blurry vision, weight changes or redness, sores or blisters on feet. The patient has not had a diabetic eye exam in the last 12 months.         She eats 2-3 servings of fruits and vegetables daily.She consumes 1 sweetened beverage(s) daily.She exercises with enough effort to increase her heart rate 9 or less minutes per day.  She exercises with enough effort to increase her heart rate 3 or less days per week.   She is taking medications regularly.        Diabetes Follow-up    Diabetes history:  Frequency of checking blood sugars: fasting  Blood sugar range:  Fasting:  Under 120 most of the time.  Lunch:  n/a  Supper:  n/a  Bedtime:  n/a  Taking aspirin?   yes  On statin?   Yes, Simvastatin   Taking ACEI/ARB?  Yes, lisinopril   Last eye exam:  11/10/2020, planning to make her appointment soon.  [facility]  Smoking?  no  Interested in cessation?  n/a        BP Readings from Last 2 Encounters:   04/28/22 136/80   10/07/21 138/74     Hemoglobin A1C POCT (%)   Date Value   05/17/2021 5.5   01/07/2021 5.8     Hemoglobin A1C (%)   Date Value   04/28/2022 5.7   10/07/2021 5.5     LDL Cholesterol Calculated (mg/dL)   Date Value   04/28/2022 81   10/07/2021 75   05/17/2021 79   01/07/2021 91     Review of Systems   Constitutional, HEENT, cardiovascular, pulmonary, GI, , musculoskeletal, neuro, skin, endocrine and psych systems are negative, except as otherwise noted.      Objective    /80 (BP Location: Right arm, Patient Position: Sitting, Cuff Size: Adult Regular)   Pulse 62   Temp (!) 96.7  F (35.9  C) (Tympanic)   Resp 16   Wt 65.1 kg (143 lb 9.6 oz)   LMP  (LMP Unknown)   SpO2 97%   Breastfeeding No   BMI 25.04 kg/m    Body mass index is 25.04 kg/m .  Physical Exam  Vitals and nursing note reviewed.   Constitutional:       Appearance: Normal appearance. She is well-developed.   HENT:      Head: Normocephalic.   Eyes:      Extraocular Movements: Extraocular movements intact.      Conjunctiva/sclera: Conjunctivae normal.      Pupils: Pupils are equal, round, and reactive to light.   Cardiovascular:      Rate and Rhythm: Normal rate and regular rhythm.      Heart sounds: Normal heart sounds. No murmur heard.  Pulmonary:      Effort: Pulmonary effort is normal. No respiratory distress.      Breath sounds: Normal breath sounds. No wheezing or rales.   Abdominal:      General: Abdomen is flat. Bowel sounds are normal.      Palpations: Abdomen is soft.      Tenderness: There is no abdominal tenderness.   Musculoskeletal:         General: Normal range of motion.      Cervical back: Normal range of motion.      Right foot: Normal. Normal capillary refill. No  swelling.      Left foot: Normal. Normal capillary refill. No swelling.   Skin:     General: Skin is warm.      Comments: No lesions on feet.   Neurological:      Mental Status: She is alert and oriented to person, place, and time.      Comments: Diabetic Foot Exam:    Normal sensation with diabetic monofilament exam bilaterally.        Results for orders placed or performed in visit on 04/28/22   Lipid Profile     Status: None   Result Value Ref Range    Cholesterol 162 <200 mg/dL    Triglycerides 93 <150 mg/dL    Direct Measure HDL 62 23 - 92 mg/dL    LDL Cholesterol Calculated 81 <=100 mg/dL    Non HDL Cholesterol 100 <130 mg/dL    Patient Fasting > 8hrs? Yes     Narrative    Cholesterol  Desirable:  <200 mg/dL    Triglycerides  Normal:  Less than 150 mg/dL  Borderline High:  150-199 mg/dL  High:  200-499 mg/dL  Very High:  Greater than or equal to 500 mg/dL    Direct Measure HDL  Female:  Greater than or equal to 50 mg/dL   Male:  Greater than or equal to 40 mg/dL    LDL Cholesterol  Desirable:  <100mg/dL  Above Desirable:  100-129 mg/dL   Borderline High:  130-159 mg/dL   High:  160-189 mg/dL   Very High:  >= 190 mg/dL    Non HDL Cholesterol  Desirable:  130 mg/dL  Above Desirable:  130-159 mg/dL  Borderline High:  160-189 mg/dL  High:  190-219 mg/dL  Very High:  Greater than or equal to 220 mg/dL   Hemoglobin A1c     Status: Normal   Result Value Ref Range    Hemoglobin A1C 5.7 4.0 - 6.2 %   Comprehensive metabolic panel     Status: Abnormal   Result Value Ref Range    Sodium 139 134 - 144 mmol/L    Potassium 4.1 3.5 - 5.1 mmol/L    Chloride 103 98 - 107 mmol/L    Carbon Dioxide (CO2) 27 21 - 31 mmol/L    Anion Gap 9 3 - 14 mmol/L    Urea Nitrogen 22 7 - 25 mg/dL    Creatinine 1.07 0.60 - 1.20 mg/dL    Calcium 9.8 8.6 - 10.3 mg/dL    Glucose 92 70 - 105 mg/dL    Alkaline Phosphatase 51 34 - 104 U/L    AST 19 13 - 39 U/L    ALT 11 7 - 52 U/L    Protein Total 7.3 6.4 - 8.9 g/dL    Albumin 4.4 3.5 - 5.7 g/dL     Bilirubin Total 0.6 0.3 - 1.0 mg/dL    GFR Estimate 52 (L) >60 mL/min/1.73m2          Assessment & Plan       ICD-10-CM    1. Type 2 diabetes mellitus without complication, without long-term current use of insulin (H)  E11.9    2. Essential hypertension  I10    3. Hyperlipidemia, unspecified hyperlipidemia type  E78.5      1. Stable.  A1c in good range.  Blood pressure is well controlled. Eye exam is due, she plans to make an appointment.  On aspirin daily.  Non-smoker.  On ACE-I and statin.  Follow up in 6 months.  2. Blood pressure well controlled.  Continue current medications.  3. Lipids in good range.  Continue current medications.      Encouraged weight loss and regular exercise.     No follow-ups on file.    Alexa Mix MD  [facility]

## 2022-04-28 ENCOUNTER — LAB (OUTPATIENT)
Dept: LAB | Facility: OTHER | Age: 82
End: 2022-04-28
Attending: FAMILY MEDICINE
Payer: MEDICARE

## 2022-04-28 ENCOUNTER — OFFICE VISIT (OUTPATIENT)
Dept: FAMILY MEDICINE | Facility: OTHER | Age: 82
End: 2022-04-28
Attending: FAMILY MEDICINE
Payer: MEDICARE

## 2022-04-28 VITALS
RESPIRATION RATE: 16 BRPM | OXYGEN SATURATION: 97 % | BODY MASS INDEX: 25.04 KG/M2 | SYSTOLIC BLOOD PRESSURE: 136 MMHG | WEIGHT: 143.6 LBS | DIASTOLIC BLOOD PRESSURE: 80 MMHG | TEMPERATURE: 96.7 F | HEART RATE: 62 BPM

## 2022-04-28 DIAGNOSIS — E11.9 TYPE 2 DIABETES MELLITUS WITHOUT COMPLICATION, WITHOUT LONG-TERM CURRENT USE OF INSULIN (H): Primary | ICD-10-CM

## 2022-04-28 DIAGNOSIS — E11.9 TYPE 2 DIABETES MELLITUS WITHOUT COMPLICATION, WITHOUT LONG-TERM CURRENT USE OF INSULIN (H): ICD-10-CM

## 2022-04-28 DIAGNOSIS — Z13.0 SCREENING FOR DEFICIENCY ANEMIA: ICD-10-CM

## 2022-04-28 DIAGNOSIS — E78.5 HYPERLIPIDEMIA, UNSPECIFIED HYPERLIPIDEMIA TYPE: ICD-10-CM

## 2022-04-28 DIAGNOSIS — I10 ESSENTIAL HYPERTENSION: ICD-10-CM

## 2022-04-28 LAB
ALBUMIN SERPL-MCNC: 4.4 G/DL (ref 3.5–5.7)
ALP SERPL-CCNC: 51 U/L (ref 34–104)
ALT SERPL W P-5'-P-CCNC: 11 U/L (ref 7–52)
ANION GAP SERPL CALCULATED.3IONS-SCNC: 9 MMOL/L (ref 3–14)
AST SERPL W P-5'-P-CCNC: 19 U/L (ref 13–39)
BILIRUB SERPL-MCNC: 0.6 MG/DL (ref 0.3–1)
BUN SERPL-MCNC: 22 MG/DL (ref 7–25)
CALCIUM SERPL-MCNC: 9.8 MG/DL (ref 8.6–10.3)
CHLORIDE BLD-SCNC: 103 MMOL/L (ref 98–107)
CHOLEST SERPL-MCNC: 162 MG/DL
CO2 SERPL-SCNC: 27 MMOL/L (ref 21–31)
CREAT SERPL-MCNC: 1.07 MG/DL (ref 0.6–1.2)
CREAT UR-MCNC: 112 MG/DL
FASTING STATUS PATIENT QL REPORTED: YES
GFR SERPL CREATININE-BSD FRML MDRD: 52 ML/MIN/1.73M2
GLUCOSE BLD-MCNC: 92 MG/DL (ref 70–105)
HBA1C MFR BLD: 5.7 % (ref 4–6.2)
HDLC SERPL-MCNC: 62 MG/DL (ref 23–92)
LDLC SERPL CALC-MCNC: 81 MG/DL
MICROALBUMIN UR-MCNC: 9 MG/L
MICROALBUMIN/CREAT UR: 8.04 MG/G CR (ref 0–25)
NONHDLC SERPL-MCNC: 100 MG/DL
POTASSIUM BLD-SCNC: 4.1 MMOL/L (ref 3.5–5.1)
PROT SERPL-MCNC: 7.3 G/DL (ref 6.4–8.9)
SODIUM SERPL-SCNC: 139 MMOL/L (ref 134–144)
TRIGL SERPL-MCNC: 93 MG/DL

## 2022-04-28 PROCEDURE — G0463 HOSPITAL OUTPT CLINIC VISIT: HCPCS | Mod: 25

## 2022-04-28 PROCEDURE — 82043 UR ALBUMIN QUANTITATIVE: CPT | Mod: ZL

## 2022-04-28 PROCEDURE — 83036 HEMOGLOBIN GLYCOSYLATED A1C: CPT | Mod: ZL

## 2022-04-28 PROCEDURE — 80053 COMPREHEN METABOLIC PANEL: CPT | Mod: ZL

## 2022-04-28 PROCEDURE — 36415 COLL VENOUS BLD VENIPUNCTURE: CPT | Mod: ZL

## 2022-04-28 PROCEDURE — 80061 LIPID PANEL: CPT | Mod: ZL

## 2022-04-28 PROCEDURE — 91305 COVID-19,PF,PFIZER (12+ YRS): CPT

## 2022-04-28 PROCEDURE — G0463 HOSPITAL OUTPT CLINIC VISIT: HCPCS

## 2022-04-28 PROCEDURE — 99213 OFFICE O/P EST LOW 20 MIN: CPT | Performed by: FAMILY MEDICINE

## 2022-04-28 ASSESSMENT — PAIN SCALES - GENERAL: PAINLEVEL: NO PAIN (0)

## 2022-04-28 NOTE — NURSING NOTE
Chief Complaint   Patient presents with     Diabetes     Previous A1C is at goal of <8  Lab Results   Component Value Date    A1C 5.7 04/28/2022    A1C 5.5 10/07/2021    A1C 5.5 05/17/2021    A1C 5.8 01/07/2021    A1C 6.4 10/06/2020    A1C 6.7 10/11/2018     Urine microalbumin:creatine: 4/28/2022  Foot exam 10/27/2021  Eye exam DUE    Tobacco User NO  Patient is on a daily aspirin  Patient is on a Statin.  Blood pressure today of:     BP Readings from Last 1 Encounters:   04/28/22 136/80      is at the goal of <139/89 for diabetics.      Medication Reconciliation: complete    Lesley Rawls LPN'

## 2022-04-28 NOTE — LETTER
Oanh BRENNA Bowen  [address] [facility] [address]    4/28/2022      Dear Ms. Bowen,      I wanted to let you know about your recent results.  Your urine microalbumin/creatinine ratio returned normal.  Please contact us at [phone] with any questions or concerns that you have.    I attached your lab results for your records.        Sincerely,         Alexa Mix MD     Resulted Orders   Lipid Profile   Result Value Ref Range    Cholesterol 162 <200 mg/dL    Triglycerides 93 <150 mg/dL    Direct Measure HDL 62 23 - 92 mg/dL    LDL Cholesterol Calculated 81 <=100 mg/dL    Non HDL Cholesterol 100 <130 mg/dL    Patient Fasting > 8hrs? Yes     Narrative    Cholesterol  Desirable:  <200 mg/dL    Triglycerides  Normal:  Less than 150 mg/dL  Borderline High:  150-199 mg/dL  High:  200-499 mg/dL  Very High:  Greater than or equal to 500 mg/dL    Direct Measure HDL  Female:  Greater than or equal to 50 mg/dL   Male:  Greater than or equal to 40 mg/dL    LDL Cholesterol  Desirable:  <100mg/dL  Above Desirable:  100-129 mg/dL   Borderline High:  130-159 mg/dL   High:  160-189 mg/dL   Very High:  >= 190 mg/dL    Non HDL Cholesterol  Desirable:  130 mg/dL  Above Desirable:  130-159 mg/dL  Borderline High:  160-189 mg/dL  High:  190-219 mg/dL  Very High:  Greater than or equal to 220 mg/dL   Hemoglobin A1c   Result Value Ref Range    Hemoglobin A1C 5.7 4.0 - 6.2 %   Comprehensive metabolic panel   Result Value Ref Range    Sodium 139 134 - 144 mmol/L    Potassium 4.1 3.5 - 5.1 mmol/L    Chloride 103 98 - 107 mmol/L    Carbon Dioxide (CO2) 27 21 - 31 mmol/L    Anion Gap 9 3 - 14 mmol/L    Urea Nitrogen 22 7 - 25 mg/dL    Creatinine 1.07 0.60 - 1.20 mg/dL    Calcium 9.8 8.6 - 10.3 mg/dL    Glucose 92 70 - 105 mg/dL    Alkaline Phosphatase 51 34 - 104 U/L    AST 19 13 - 39 U/L    ALT 11 7 - 52 U/L    Protein Total 7.3 6.4 - 8.9 g/dL    Albumin 4.4 3.5 - 5.7 g/dL    Bilirubin Total 0.6 0.3 - 1.0  mg/dL    GFR Estimate 52 (L) >60 mL/min/1.73m2      Comment:      Effective December 21, 2021 eGFRcr in adults is calculated using the 2021 CKD-EPI creatinine equation which includes age and gender (Donovan et al., NEJM, DOI: 10.1056/FOGTil3408908)   Albumin Random Urine Quantitative with Creat Ratio   Result Value Ref Range    Creatinine Urine mg/dL 112 mg/dL    Albumin Urine mg/L 9 mg/L    Albumin Urine mg/g Cr 8.04 0.00 - 25.00 mg/g Cr

## 2022-07-05 DIAGNOSIS — E11.9 TYPE 2 DIABETES MELLITUS WITHOUT COMPLICATION, WITHOUT LONG-TERM CURRENT USE OF INSULIN (H): ICD-10-CM

## 2022-07-07 RX ORDER — BLOOD SUGAR DIAGNOSTIC
STRIP MISCELLANEOUS
Qty: 100 STRIP | Refills: 5 | Status: SHIPPED | OUTPATIENT
Start: 2022-07-07 | End: 2022-08-02

## 2022-07-07 NOTE — TELEPHONE ENCOUNTER
"Requested Prescriptions   Pending Prescriptions Disp Refills     blood glucose (ONETOUCH VERIO IQ) test strip [Pharmacy Med Name: OneTouch Verio In Vitro Strip]  0     Sig: USE  STRIP TO CHECK GLUCOSE ONCE DAILY       Diabetic Supplies Protocol Passed - 7/5/2022 11:43 AM        Passed - Medication is active on med list        Passed - Patient is 18 years of age or older        Passed - Recent (6 mo) or future (30 days) visit within the authorizing provider's specialty     Patient had office visit in the last 6 months or has a visit in the next 30 days with authorizing provider.  See \"Patient Info\" tab in inbasket, or \"Choose Columns\" in Meds & Orders section of the refill encounter.               ONETOUCH VERIO IQ test strip     Sig: USE  STRIP TO CHECK GLUCOSE ONCE DAILY           Last Written Prescription Date:  10/7/21  Last Fill Quantity: 100,   # refills: 11  Last Office Visit:4/28/21  Future Office visit:    Next 5 appointments (look out 90 days)    Sep 29, 2022  8:40 AM  SHORT with Alexa Mix MD  [facility] ([facility] ) [address]  [place] [address]  [phone]           Routing refill request to provider for review/approval because:  Drug not on the [facility], [facility] or [facility] refill protocol or controlled substance Charu Rivas RN on 7/7/2022 at 11:02 AM        "

## 2022-08-02 ENCOUNTER — TELEPHONE (OUTPATIENT)
Dept: FAMILY MEDICINE | Facility: OTHER | Age: 82
End: 2022-08-02

## 2022-08-02 DIAGNOSIS — E11.9 TYPE 2 DIABETES MELLITUS WITHOUT COMPLICATION, WITHOUT LONG-TERM CURRENT USE OF INSULIN (H): ICD-10-CM

## 2022-08-02 RX ORDER — BLOOD SUGAR DIAGNOSTIC
STRIP MISCELLANEOUS
Qty: 100 STRIP | Refills: 11 | Status: SHIPPED | OUTPATIENT
Start: 2022-08-02 | End: 2022-09-30

## 2022-08-02 NOTE — TELEPHONE ENCOUNTER
[facility]-patient is looking for her test stripes to be sent to [facility] and patient would like to be called to know this has been done    Please call and advise    Thank You    Nataliya Mir on 8/2/2022 at 9:26 AM

## 2022-08-02 NOTE — TELEPHONE ENCOUNTER
Patient needs temp supply of test strips sent to [facility].     Has appt on 9/30.     Lesley Rawls LPN on 8/2/2022 at 9:48 AM

## 2022-09-21 ENCOUNTER — TRANSFERRED RECORDS (OUTPATIENT)
Dept: HEALTH INFORMATION MANAGEMENT | Facility: OTHER | Age: 82
End: 2022-09-21

## 2022-09-21 LAB — RETINOPATHY: NEGATIVE

## 2022-09-22 ENCOUNTER — IMMUNIZATION (OUTPATIENT)
Dept: FAMILY MEDICINE | Facility: OTHER | Age: 82
End: 2022-09-22
Attending: FAMILY MEDICINE
Payer: MEDICARE

## 2022-09-22 DIAGNOSIS — Z23 HIGH PRIORITY FOR 2019-NCOV VACCINE: ICD-10-CM

## 2022-09-22 DIAGNOSIS — Z23 NEED FOR PROPHYLACTIC VACCINATION AND INOCULATION AGAINST INFLUENZA: Primary | ICD-10-CM

## 2022-09-22 PROCEDURE — G0008 ADMIN INFLUENZA VIRUS VAC: HCPCS

## 2022-09-22 PROCEDURE — 91312 COVID-19,PF,PFIZER BOOSTER BIVALENT: CPT

## 2022-09-30 ENCOUNTER — LAB (OUTPATIENT)
Dept: LAB | Facility: OTHER | Age: 82
End: 2022-09-30
Attending: FAMILY MEDICINE
Payer: MEDICARE

## 2022-09-30 ENCOUNTER — TELEPHONE (OUTPATIENT)
Dept: FAMILY MEDICINE | Facility: OTHER | Age: 82
End: 2022-09-30

## 2022-09-30 DIAGNOSIS — Z13.0 SCREENING FOR DEFICIENCY ANEMIA: ICD-10-CM

## 2022-09-30 DIAGNOSIS — E78.5 HYPERLIPIDEMIA, UNSPECIFIED HYPERLIPIDEMIA TYPE: ICD-10-CM

## 2022-09-30 DIAGNOSIS — I10 ESSENTIAL HYPERTENSION: ICD-10-CM

## 2022-09-30 DIAGNOSIS — E11.9 TYPE 2 DIABETES MELLITUS WITHOUT COMPLICATION, WITHOUT LONG-TERM CURRENT USE OF INSULIN (H): ICD-10-CM

## 2022-09-30 LAB
ALBUMIN SERPL-MCNC: 4.4 G/DL (ref 3.5–5.7)
ALP SERPL-CCNC: 62 U/L (ref 34–104)
ALT SERPL W P-5'-P-CCNC: 11 U/L (ref 7–52)
ANION GAP SERPL CALCULATED.3IONS-SCNC: 8 MMOL/L (ref 3–14)
AST SERPL W P-5'-P-CCNC: 14 U/L (ref 13–39)
BASOPHILS # BLD AUTO: 0.1 10E3/UL (ref 0–0.2)
BASOPHILS NFR BLD AUTO: 1 %
BILIRUB SERPL-MCNC: 0.5 MG/DL (ref 0.3–1)
BUN SERPL-MCNC: 24 MG/DL (ref 7–25)
CALCIUM SERPL-MCNC: 10.6 MG/DL (ref 8.6–10.3)
CHLORIDE BLD-SCNC: 101 MMOL/L (ref 98–107)
CHOLEST SERPL-MCNC: 191 MG/DL
CO2 SERPL-SCNC: 29 MMOL/L (ref 21–31)
CREAT SERPL-MCNC: 1.01 MG/DL (ref 0.6–1.2)
CREAT UR-MCNC: 35.6 MG/DL
EOSINOPHIL # BLD AUTO: 0.1 10E3/UL (ref 0–0.7)
EOSINOPHIL NFR BLD AUTO: 2 %
ERYTHROCYTE [DISTWIDTH] IN BLOOD BY AUTOMATED COUNT: 12 % (ref 10–15)
FASTING STATUS PATIENT QL REPORTED: YES
GFR SERPL CREATININE-BSD FRML MDRD: 56 ML/MIN/1.73M2
GLUCOSE BLD-MCNC: 96 MG/DL (ref 70–105)
HBA1C MFR BLD: 5.8 % (ref 4–6.2)
HCT VFR BLD AUTO: 38 % (ref 35–47)
HDLC SERPL-MCNC: 66 MG/DL (ref 23–92)
HGB BLD-MCNC: 12.9 G/DL (ref 11.7–15.7)
IMM GRANULOCYTES # BLD: 0 10E3/UL
IMM GRANULOCYTES NFR BLD: 1 %
LDLC SERPL CALC-MCNC: 100 MG/DL
LYMPHOCYTES # BLD AUTO: 2.3 10E3/UL (ref 0.8–5.3)
LYMPHOCYTES NFR BLD AUTO: 36 %
MCH RBC QN AUTO: 31.2 PG (ref 26.5–33)
MCHC RBC AUTO-ENTMCNC: 33.9 G/DL (ref 31.5–36.5)
MCV RBC AUTO: 92 FL (ref 78–100)
MICROALBUMIN UR-MCNC: <12 MG/L
MICROALBUMIN/CREAT UR: NORMAL MG/G{CREAT}
MONOCYTES # BLD AUTO: 0.7 10E3/UL (ref 0–1.3)
MONOCYTES NFR BLD AUTO: 11 %
NEUTROPHILS # BLD AUTO: 3.2 10E3/UL (ref 1.6–8.3)
NEUTROPHILS NFR BLD AUTO: 49 %
NONHDLC SERPL-MCNC: 125 MG/DL
NRBC # BLD AUTO: 0 10E3/UL
NRBC BLD AUTO-RTO: 0 /100
PLATELET # BLD AUTO: 268 10E3/UL (ref 150–450)
POTASSIUM BLD-SCNC: 4.4 MMOL/L (ref 3.5–5.1)
PROT SERPL-MCNC: 7.5 G/DL (ref 6.4–8.9)
RBC # BLD AUTO: 4.13 10E6/UL (ref 3.8–5.2)
SODIUM SERPL-SCNC: 138 MMOL/L (ref 134–144)
TRIGL SERPL-MCNC: 127 MG/DL
TSH SERPL DL<=0.005 MIU/L-ACNC: 1.91 MU/L (ref 0.4–4)
WBC # BLD AUTO: 6.4 10E3/UL (ref 4–11)

## 2022-09-30 PROCEDURE — 84443 ASSAY THYROID STIM HORMONE: CPT | Mod: ZL

## 2022-09-30 PROCEDURE — 83036 HEMOGLOBIN GLYCOSYLATED A1C: CPT | Mod: ZL

## 2022-09-30 PROCEDURE — 82040 ASSAY OF SERUM ALBUMIN: CPT | Mod: ZL

## 2022-09-30 PROCEDURE — 85014 HEMATOCRIT: CPT | Mod: ZL

## 2022-09-30 PROCEDURE — 80061 LIPID PANEL: CPT | Mod: ZL

## 2022-09-30 PROCEDURE — 36415 COLL VENOUS BLD VENIPUNCTURE: CPT | Mod: ZL

## 2022-09-30 PROCEDURE — 80053 COMPREHEN METABOLIC PANEL: CPT | Mod: ZL

## 2022-09-30 PROCEDURE — 82043 UR ALBUMIN QUANTITATIVE: CPT | Mod: ZL

## 2022-09-30 NOTE — TELEPHONE ENCOUNTER
Talked with patient and she would like her meds refilled to [facility] and her blood sugar supplies she would like a hard copy for as they [place].   Okay for Monday. She will come  the Rx's at the [address] window. Please call when completed.   Briana Subramanian LPN   9/30/2022  8:35 AM

## 2022-09-30 NOTE — TELEPHONE ENCOUNTER
Patient just had labs done, wondering she can get a med refill and not be seen, since she is leaving Wednesday to [place] for the winter.     Please call back thank you   Nita House on 9/30/2022 at 7:47 AM

## 2022-10-03 DIAGNOSIS — E78.5 HYPERLIPIDEMIA, UNSPECIFIED HYPERLIPIDEMIA TYPE: ICD-10-CM

## 2022-10-03 DIAGNOSIS — E11.9 TYPE 2 DIABETES MELLITUS WITHOUT COMPLICATION, WITHOUT LONG-TERM CURRENT USE OF INSULIN (H): ICD-10-CM

## 2022-10-03 DIAGNOSIS — I10 ESSENTIAL HYPERTENSION: ICD-10-CM

## 2022-10-03 RX ORDER — LISINOPRIL 10 MG/1
10 TABLET ORAL DAILY
Qty: 90 TABLET | Refills: 3 | Status: SHIPPED | OUTPATIENT
Start: 2022-10-03 | End: 2023-09-28

## 2022-10-03 RX ORDER — SIMVASTATIN 10 MG
10 TABLET ORAL AT BEDTIME
Qty: 90 TABLET | Refills: 3 | Status: SHIPPED | OUTPATIENT
Start: 2022-10-03 | End: 2023-09-28

## 2022-10-03 RX ORDER — BLOOD SUGAR DIAGNOSTIC
STRIP MISCELLANEOUS
Qty: 100 STRIP | Refills: 11 | Status: SHIPPED | OUTPATIENT
Start: 2022-10-03 | End: 2023-05-22

## 2022-10-03 NOTE — TELEPHONE ENCOUNTER
PCP out. Sending to alternate provider. Just needs pending RX's sent to [facility].     Lesley Rawls LPN on 10/3/2022 at 2:37 PM

## 2022-10-03 NOTE — TELEPHONE ENCOUNTER
Pt called again and is wondering where her prescription is.  Please call.    Antonio Gunderson on 10/3/2022 at 12:57 PM

## 2022-10-06 RX ORDER — LISINOPRIL 10 MG/1
TABLET ORAL
Qty: 90 TABLET | Refills: 0 | OUTPATIENT
Start: 2022-10-06

## 2022-10-06 RX ORDER — SIMVASTATIN 10 MG
TABLET ORAL
Qty: 90 TABLET | Refills: 0 | OUTPATIENT
Start: 2022-10-06

## 2022-10-06 NOTE — TELEPHONE ENCOUNTER
lisinopril (ZESTRIL) 10 MG tablet 90 tablet 3 10/3/2022  No   Sig - Route: Take 1 tablet (10 mg) by mouth daily     simvastatin (ZOCOR) 10 MG tablet 90 tablet 3 10/3/2022  No   Sig - Route: Take 1 tablet (10 mg) by mouth At Bedtime      To [facility]    [facility] requesting.    Should have refills  Lesvia Gibbons RN on 10/6/2022 at 11:30 AM

## 2023-05-22 ENCOUNTER — TELEPHONE (OUTPATIENT)
Dept: FAMILY MEDICINE | Facility: OTHER | Age: 83
End: 2023-05-22
Payer: MEDICARE

## 2023-05-22 DIAGNOSIS — E11.9 TYPE 2 DIABETES MELLITUS WITHOUT COMPLICATION, WITHOUT LONG-TERM CURRENT USE OF INSULIN (H): ICD-10-CM

## 2023-05-22 DIAGNOSIS — Z13.0 SCREENING FOR DEFICIENCY ANEMIA: Primary | ICD-10-CM

## 2023-05-22 RX ORDER — BLOOD SUGAR DIAGNOSTIC
STRIP MISCELLANEOUS
Qty: 100 STRIP | Refills: 11 | Status: SHIPPED | OUTPATIENT
Start: 2023-05-22 | End: 2023-09-28

## 2023-05-22 NOTE — TELEPHONE ENCOUNTER
Called and verified patient full name and . Patient needs Test Strips sent to [facility].     Also would like labs drawn before her 2 Pm PX on 23.    Lesley Rawls LPN on 2023 at 8:38 AM

## 2023-05-22 NOTE — TELEPHONE ENCOUNTER
Called and verified patient full name and . Notified patient of below. States she will call back for appt.     Lesley Rawls LPN on 2023 at 9:57 AM

## 2023-05-22 NOTE — TELEPHONE ENCOUNTER
Please call the patient.  She would like lab orders placed for her Wellness visit in July and she is having issues getting her test strips filled.  [facility].      Anny Payne on 5/22/2023 at 8:09 AM

## 2023-05-23 ENCOUNTER — TELEPHONE (OUTPATIENT)
Dept: FAMILY MEDICINE | Facility: OTHER | Age: 83
End: 2023-05-23
Payer: MEDICARE

## 2023-05-23 NOTE — TELEPHONE ENCOUNTER
[facility]-patient is waiting on signed form that was faxed from pharmacy for her testing stripes    Please call and advise    Thank You    Natlaiya Mir on 5/23/2023 at 10:43 AM

## 2023-05-23 NOTE — TELEPHONE ENCOUNTER
Called and verified patient full name and . Notified patient that form was completed yesterday.     Lesley Rawls LPN on 2023 at 11:05 AM

## 2023-06-22 ENCOUNTER — TELEPHONE (OUTPATIENT)
Dept: FAMILY MEDICINE | Facility: OTHER | Age: 83
End: 2023-06-22
Payer: MEDICARE

## 2023-06-22 NOTE — TELEPHONE ENCOUNTER
Called [facility] pharmacy and verified that patient's blood glucose test strips were sent in and picked up on 5/22/23. Blood glucose test strips were sent to [facility] pharmacy.     Called [facility] pharmacy and let them know that patient moved their test strips over to [facility].    Shanta Peguero RN on 6/22/2023 at 1:28 PM

## 2023-06-22 NOTE — TELEPHONE ENCOUNTER
Nini with [facility] pharmacy called and requested a call back regarding getting the patients test strips filled, Nini stated multiple faxes have been sent.    Jazmine Randall on 6/22/2023 at 11:28 AM     calm

## 2023-07-07 ENCOUNTER — LAB (OUTPATIENT)
Dept: LAB | Facility: OTHER | Age: 83
End: 2023-07-07
Attending: FAMILY MEDICINE
Payer: MEDICARE

## 2023-07-07 ENCOUNTER — OFFICE VISIT (OUTPATIENT)
Dept: FAMILY MEDICINE | Facility: OTHER | Age: 83
End: 2023-07-07
Attending: FAMILY MEDICINE
Payer: MEDICARE

## 2023-07-07 VITALS
SYSTOLIC BLOOD PRESSURE: 130 MMHG | WEIGHT: 144.4 LBS | RESPIRATION RATE: 16 BRPM | OXYGEN SATURATION: 96 % | DIASTOLIC BLOOD PRESSURE: 74 MMHG | BODY MASS INDEX: 25.18 KG/M2 | HEART RATE: 74 BPM | TEMPERATURE: 97.5 F

## 2023-07-07 DIAGNOSIS — Z00.00 ENCOUNTER FOR MEDICARE ANNUAL WELLNESS EXAM: Primary | ICD-10-CM

## 2023-07-07 DIAGNOSIS — E11.9 TYPE 2 DIABETES MELLITUS WITHOUT COMPLICATION, WITHOUT LONG-TERM CURRENT USE OF INSULIN (H): ICD-10-CM

## 2023-07-07 DIAGNOSIS — Z13.0 SCREENING FOR DEFICIENCY ANEMIA: ICD-10-CM

## 2023-07-07 DIAGNOSIS — E78.5 HYPERLIPIDEMIA, UNSPECIFIED HYPERLIPIDEMIA TYPE: ICD-10-CM

## 2023-07-07 DIAGNOSIS — I10 ESSENTIAL HYPERTENSION: ICD-10-CM

## 2023-07-07 DIAGNOSIS — Z12.31 VISIT FOR SCREENING MAMMOGRAM: ICD-10-CM

## 2023-07-07 DIAGNOSIS — Z23 NEED FOR SHINGLES VACCINE: ICD-10-CM

## 2023-07-07 LAB
ALBUMIN SERPL BCG-MCNC: 4.5 G/DL (ref 3.5–5.2)
ALP SERPL-CCNC: 72 U/L (ref 35–104)
ALT SERPL W P-5'-P-CCNC: 13 U/L (ref 0–50)
ANION GAP SERPL CALCULATED.3IONS-SCNC: 11 MMOL/L (ref 7–15)
AST SERPL W P-5'-P-CCNC: 17 U/L (ref 0–45)
BILIRUB SERPL-MCNC: 0.5 MG/DL
BUN SERPL-MCNC: 19.3 MG/DL (ref 8–23)
CALCIUM SERPL-MCNC: 9.8 MG/DL (ref 8.8–10.2)
CHLORIDE SERPL-SCNC: 101 MMOL/L (ref 98–107)
CHOLEST SERPL-MCNC: 189 MG/DL
CREAT SERPL-MCNC: 0.99 MG/DL (ref 0.51–0.95)
CREAT UR-MCNC: 33.5 MG/DL
DEPRECATED HCO3 PLAS-SCNC: 26 MMOL/L (ref 22–29)
ERYTHROCYTE [DISTWIDTH] IN BLOOD BY AUTOMATED COUNT: 12 % (ref 10–15)
GFR SERPL CREATININE-BSD FRML MDRD: 57 ML/MIN/1.73M2
GLUCOSE SERPL-MCNC: 91 MG/DL (ref 70–99)
HBA1C MFR BLD: 5.4 % (ref 4–6.2)
HCT VFR BLD AUTO: 38.1 % (ref 35–47)
HDLC SERPL-MCNC: 64 MG/DL
HGB BLD-MCNC: 13 G/DL (ref 11.7–15.7)
HOLD SPECIMEN: NORMAL
LDLC SERPL CALC-MCNC: 89 MG/DL
MCH RBC QN AUTO: 31.2 PG (ref 26.5–33)
MCHC RBC AUTO-ENTMCNC: 34.1 G/DL (ref 31.5–36.5)
MCV RBC AUTO: 91 FL (ref 78–100)
MICROALBUMIN UR-MCNC: <12 MG/L
MICROALBUMIN/CREAT UR: NORMAL MG/G{CREAT}
NONHDLC SERPL-MCNC: 125 MG/DL
PLATELET # BLD AUTO: 228 10E3/UL (ref 150–450)
POTASSIUM SERPL-SCNC: 4.1 MMOL/L (ref 3.4–5.3)
PROT SERPL-MCNC: 7.9 G/DL (ref 6.4–8.3)
RBC # BLD AUTO: 4.17 10E6/UL (ref 3.8–5.2)
SODIUM SERPL-SCNC: 138 MMOL/L (ref 136–145)
TRIGL SERPL-MCNC: 181 MG/DL
TSH SERPL DL<=0.005 MIU/L-ACNC: 1.6 UIU/ML (ref 0.3–4.2)
WBC # BLD AUTO: 7 10E3/UL (ref 4–11)

## 2023-07-07 PROCEDURE — 80053 COMPREHEN METABOLIC PANEL: CPT | Mod: ZL

## 2023-07-07 PROCEDURE — 85027 COMPLETE CBC AUTOMATED: CPT | Mod: ZL

## 2023-07-07 PROCEDURE — 82570 ASSAY OF URINE CREATININE: CPT | Mod: ZL

## 2023-07-07 PROCEDURE — 36415 COLL VENOUS BLD VENIPUNCTURE: CPT | Mod: ZL

## 2023-07-07 PROCEDURE — 83036 HEMOGLOBIN GLYCOSYLATED A1C: CPT | Mod: ZL

## 2023-07-07 PROCEDURE — 84443 ASSAY THYROID STIM HORMONE: CPT | Mod: ZL

## 2023-07-07 PROCEDURE — G0439 PPPS, SUBSEQ VISIT: HCPCS | Performed by: FAMILY MEDICINE

## 2023-07-07 PROCEDURE — 80061 LIPID PANEL: CPT | Mod: ZL

## 2023-07-07 ASSESSMENT — ENCOUNTER SYMPTOMS
FREQUENCY: 0
CONSTIPATION: 0
PARESTHESIAS: 0
BREAST MASS: 0
EYE PAIN: 0
HEADACHES: 0
ARTHRALGIAS: 0
WEAKNESS: 0
NAUSEA: 0
SHORTNESS OF BREATH: 0
JOINT SWELLING: 0
CHILLS: 0
MYALGIAS: 0
SORE THROAT: 0
HEMATOCHEZIA: 0
PALPITATIONS: 0
DIZZINESS: 0
ABDOMINAL PAIN: 0
DYSURIA: 0
COUGH: 0
FEVER: 0
NERVOUS/ANXIOUS: 0
DIARRHEA: 0
HEMATURIA: 0

## 2023-07-07 ASSESSMENT — ACTIVITIES OF DAILY LIVING (ADL): CURRENT_FUNCTION: NO ASSISTANCE NEEDED

## 2023-07-07 ASSESSMENT — PAIN SCALES - GENERAL: PAINLEVEL: NO PAIN (0)

## 2023-07-07 NOTE — NURSING NOTE
Chief Complaint   Patient presents with     Wellness Visit     Previous A1C is at goal of <8  Lab Results   Component Value Date    A1C 5.4 07/07/2023    A1C 5.8 09/30/2022    A1C 5.7 04/28/2022    A1C 5.5 10/07/2021    A1C 5.5 05/17/2021    A1C 5.8 01/07/2021    A1C 6.4 10/06/2020    A1C 6.7 10/11/2018     Urine microalbumin:creatine: 7/7/2022  Foot exam 4/2022  Eye exam 9/2022    Tobacco User NO  Patient is on a daily aspirin  Patient is on a Statin.  Blood pressure today of:     BP Readings from Last 1 Encounters:   07/07/23 130/74      is at the goal of <139/89 for diabetics.      Medication Reconciliation: complete    Lesley Rawls LPN

## 2023-07-07 NOTE — PATIENT INSTRUCTIONS
Patient Education   Personalized Prevention Plan  You are due for the preventive services outlined below.  Your care team is available to assist you in scheduling these services.  If you have already completed any of these items, please share that information with your care team to update in your medical record.  Health Maintenance Due   Topic Date Due     Osteoporosis Screening  Never done     Zoster (Shingles) Vaccine (2 of 3) 11/27/2014     Annual Wellness Visit  10/07/2022     Diabetic Foot Exam  10/07/2022     COVID-19 Vaccine (6 - Pfizer series) 01/22/2023

## 2023-07-07 NOTE — PROGRESS NOTES
"SUBJECTIVE:   Oanh is a 83 year old who presents for Preventive Visit.       No data to display              Are you in the first 12 months of your Medicare coverage?  No    Healthy Habits:     In general, how would you rate your overall health?  Good    Frequency of exercise:  2-3 days/week    Duration of exercise:  Less than 15 minutes    Do you usually eat at least 4 servings of fruit and vegetables a day, include whole grains    & fiber and avoid regularly eating high fat or \"junk\" foods?  Yes    Taking medications regularly:  Yes    Medication side effects:  None    Ability to successfully perform activities of daily living:  No assistance needed    Home Safety:  No safety concerns identified    Hearing Impairment:  No hearing concerns    In the past 6 months, have you been bothered by leaking of urine?  No    In general, how would you rate your overall mental or emotional health?  Good    Diabetes history:  Frequency of checking blood sugars: daily, fasting  Blood sugar range:  Fasting:  Under 120  Lunch:  n/a  Supper:  n/a  Bedtime:  n/a  Lows under 70?  no  Highs above 200?  no  Symptoms of hypoglycemia?   no  Sores on feet?  no  Numbness/Paresthesias?  no  Excessive thirst or urination?   no  Unintended weight loss or gain?  no  Taking aspirin?   yes  On statin?  Simvastatin   Taking ACEI/ARB?  Lisinopril   Last eye exam:  9/23/22  [facility]  Smoking?  no  Interested in cessation?  n/a    Have you ever done Advance Care Planning? (For example, a Health Directive, POLST, or a discussion with a medical provider or your loved ones about your wishes): Yes, patient states has an Advance Care Planning document and will bring a copy to the clinic.       Fall risk  Fallen 2 or more times in the past year?: No  Any fall with injury in the past year?: No    Cognitive Screening   1) Repeat 3 items (Leader, Season, Table)    2) Clock draw: NORMAL  3) 3 item recall: Recalls 3 objects  Results: 3 " items recalled: COGNITIVE IMPAIRMENT LESS LIKELY    Mini-CogTM Copyright KIAN Beck. Licensed by the author for use in [facility]; reprinted with permission (samantha@[facility]). All rights reserved.      Do you have sleep apnea, excessive snoring or daytime drowsiness?: no    Reviewed and updated as needed this visit by clinical staff   Tobacco  Allergies  Meds  Problems             Reviewed and updated as needed this visit by Provider    Allergies  Meds  Problems            Social History     Tobacco Use     Smoking status: Never     Smokeless tobacco: Never   Substance Use Topics     Alcohol use: Yes             7/7/2023     1:42 PM   Alcohol Use   Prescreen: >3 drinks/day or >7 drinks/week? No     Do you have a current opioid prescription? No  Do you use any other controlled substances or medications that are not prescribed by a provider? None            Current providers sharing in care for this patient include:   Patient Care Team:  Alexa Mix MD as PCP - General (Family Medicine)  Alexa Mix MD as Assigned PCP  Kylie Tse RN as Diabetes Educator (Diabetes Education)    The following health maintenance items are reviewed in Epic and correct as of today:  Health Maintenance   Topic Date Due     DEXA  Never done     ZOSTER IMMUNIZATION (2 of 3) 11/27/2014     COVID-19 Vaccine (6 - Pfizer series) 01/22/2023     INFLUENZA VACCINE (1) 09/01/2023     EYE EXAM  09/21/2023     A1C  10/07/2023     MEDICARE ANNUAL WELLNESS VISIT  07/07/2024     BMP  07/07/2024     LIPID  07/07/2024     MICROALBUMIN  07/07/2024     DIABETIC FOOT EXAM  07/07/2024     FALL RISK ASSESSMENT  07/07/2024     ADVANCE CARE PLANNING  07/07/2028     DTAP/TDAP/TD IMMUNIZATION (2 - Td or Tdap) 10/21/2029     PHQ-2 (once per calendar year)  Completed     Pneumococcal Vaccine: 65+ Years  Completed     IPV IMMUNIZATION  Aged Out     MENINGITIS IMMUNIZATION  Aged Out     Labs reviewed in [facility]  BP Readings  from Last 3 Encounters:   07/07/23 130/74   04/28/22 136/80   10/07/21 138/74    Wt Readings from Last 3 Encounters:   07/07/23 65.5 kg (144 lb 6.4 oz)   04/28/22 65.1 kg (143 lb 9.6 oz)   10/07/21 64.9 kg (143 lb)                  Patient Active Problem List   Diagnosis     Hyperlipidemia, unspecified hyperlipidemia type     Essential hypertension     Hyperglycemia     Type 2 diabetes mellitus without complication, without long-term current use of insulin (H)     Past Surgical History:   Procedure Laterality Date     COLONOSCOPY      10/24/2013,diverticulosis-no further colonoscopy rec d/t age     OTHER SURGICAL HISTORY      [id number],OTHER,Bilateral tubal ligation       Social History     Tobacco Use     Smoking status: Never     Smokeless tobacco: Never   Substance Use Topics     Alcohol use: Yes     Family History   Problem Relation Age of Onset     Cancer Mother         Cancer, lung cancer     Other - See Comments Father 70        Stroke     Breast Cancer No family hx of         Cancer-breast         Current Outpatient Medications   Medication Sig Dispense Refill     aspirin 81 MG tablet Take 81 mg by mouth daily With a meal       blood glucose (NO BRAND SPECIFIED) lancets standard Use to test blood sugar 1 time daily. Dispense as insurance allows. Dx. Code: E11.9. 100 each 11     blood glucose (NO BRAND SPECIFIED) lancing device Device to be used with lancets.  Diagnosis E11.9. 1 each 0     blood glucose (ONETOUCH VERIO IQ) test strip USE  STRIP TO CHECK GLUCOSE ONCE DAILY 100 strip 11     blood glucose monitoring (NO BRAND SPECIFIED) meter device kit Use to test blood sugar 1 time daily. Dispense as insurance allows. Dx. Code: E11.9. 1 kit 0     Blood Glucose Monitoring Suppl (ONETOUCH VERIO FLEX SYSTEM) w/Device KIT USE TO CHECK GLUCOSE ONCE DAILY       Lancets (ONETOUCH DELICA PLUS [id number]) MISC USE TO CHECK GLUCOSE ONCE DAILY       lisinopril (ZESTRIL) 10 MG tablet Take 1 tablet (10 mg) by mouth daily 90  "tablet 3     Multiple Vitamins-Iron (MULTIVITAMIN/IRON PO)        simvastatin (ZOCOR) 10 MG tablet Take 1 tablet (10 mg) by mouth At Bedtime 90 tablet 3     zoster vaccine recombinant adjuvanted (SHINGRIX) injection Inject 0.5 mLs into the muscle once for 1 dose Pharmacist administered 0.5 mL 0     No Known Allergies      Mammogram Screening - Patient over age 75, has elected to continue with screening.  Pertinent mammograms are reviewed under the imaging tab.    Review of Systems   Constitutional: Negative for chills and fever.   HENT: Negative for congestion, ear pain, hearing loss and sore throat.    Eyes: Negative for pain and visual disturbance.   Respiratory: Negative for cough and shortness of breath.    Cardiovascular: Negative for chest pain, palpitations and peripheral edema.   Gastrointestinal: Negative for abdominal pain, constipation, diarrhea, hematochezia and nausea.   Breasts:  Negative for tenderness, breast mass and discharge.   Genitourinary: Negative for dysuria, frequency, genital sores, hematuria, pelvic pain, urgency, vaginal bleeding and vaginal discharge.   Musculoskeletal: Negative for arthralgias, joint swelling and myalgias.   Skin: Negative for rash.   Neurological: Negative for dizziness, weakness, headaches and paresthesias.   Psychiatric/Behavioral: Negative for mood changes. The patient is not nervous/anxious.          OBJECTIVE:   /74   Pulse 74   Temp 97.5  F (36.4  C) (Tympanic)   Resp 16   Wt 65.5 kg (144 lb 6.4 oz)   LMP  (LMP Unknown)   SpO2 96%   Breastfeeding No   BMI 25.18 kg/m   Estimated body mass index is 25.18 kg/m  as calculated from the following:    Height as of 10/7/21: 1.613 m (5' 3.5\").    Weight as of this encounter: 65.5 kg (144 lb 6.4 oz).  Physical Exam  Vitals and nursing note reviewed.   Constitutional:       Appearance: Normal appearance. She is well-developed.   HENT:      Head: Normocephalic.      Right Ear: External ear normal.      Left " Ear: External ear normal.      Nose: Nose normal.   Eyes:      Extraocular Movements: Extraocular movements intact.      Conjunctiva/sclera: Conjunctivae normal.      Pupils: Pupils are equal, round, and reactive to light.   Neck:      Thyroid: No thyromegaly.   Cardiovascular:      Rate and Rhythm: Normal rate and regular rhythm.      Heart sounds: Normal heart sounds. No murmur heard.  Pulmonary:      Effort: Pulmonary effort is normal. No respiratory distress.      Breath sounds: Normal breath sounds. No wheezing or rales.   Chest:      Chest wall: No tenderness.   Abdominal:      General: Abdomen is flat. Bowel sounds are normal. There is no distension.      Palpations: Abdomen is soft. There is no mass.      Tenderness: There is no abdominal tenderness. There is no guarding or rebound.   Musculoskeletal:         General: No tenderness or deformity. Normal range of motion.      Cervical back: Normal range of motion and neck supple.      Right foot: Normal. Normal capillary refill. No swelling.      Left foot: Normal. Normal capillary refill. No swelling.   Lymphadenopathy:      Cervical: No cervical adenopathy.   Skin:     General: Skin is warm and dry.      Comments: No lesions on feet.   Neurological:      Mental Status: She is alert and oriented to person, place, and time.      Cranial Nerves: No cranial nerve deficit.      Coordination: Coordination normal.      Comments: Diabetic Foot Exam:    Normal sensation with diabetic monofilament exam bilaterally.           Diagnostic Test Results:  Labs reviewed in Epic  Results for orders placed or performed in visit on 07/07/23 (from the past 24 hour(s))   Comprehensive metabolic panel   Result Value Ref Range    Sodium 138 136 - 145 mmol/L    Potassium 4.1 3.4 - 5.3 mmol/L    Chloride 101 98 - 107 mmol/L    Carbon Dioxide (CO2) 26 22 - 29 mmol/L    Anion Gap 11 7 - 15 mmol/L    Urea Nitrogen 19.3 8.0 - 23.0 mg/dL    Creatinine 0.99 (H) 0.51 - 0.95 mg/dL     Calcium 9.8 8.8 - 10.2 mg/dL    Glucose 91 70 - 99 mg/dL    Alkaline Phosphatase 72 35 - 104 U/L    AST 17 0 - 45 U/L    ALT 13 0 - 50 U/L    Protein Total 7.9 6.4 - 8.3 g/dL    Albumin 4.5 3.5 - 5.2 g/dL    Bilirubin Total 0.5 <=1.2 mg/dL    GFR Estimate 57 (L) >60 mL/min/1.73m2   CBC with platelets   Result Value Ref Range    WBC Count 7.0 4.0 - 11.0 10e3/uL    RBC Count 4.17 3.80 - 5.20 10e6/uL    Hemoglobin 13.0 11.7 - 15.7 g/dL    Hematocrit 38.1 35.0 - 47.0 %    MCV 91 78 - 100 fL    MCH 31.2 26.5 - 33.0 pg    MCHC 34.1 31.5 - 36.5 g/dL    RDW 12.0 10.0 - 15.0 %    Platelet Count 228 150 - 450 10e3/uL   Hemoglobin A1c   Result Value Ref Range    Hemoglobin A1C 5.4 4.0 - 6.2 %   Lipid Profile   Result Value Ref Range    Cholesterol 189 <200 mg/dL    Triglycerides 181 (H) <150 mg/dL    Direct Measure HDL 64 >=50 mg/dL    LDL Cholesterol Calculated 89 <=100 mg/dL    Non HDL Cholesterol 125 <130 mg/dL    Narrative    Cholesterol  Desirable:  <200 mg/dL    Triglycerides  Normal:  Less than 150 mg/dL  Borderline High:  150-199 mg/dL  High:  200-499 mg/dL  Very High:  Greater than or equal to 500 mg/dL    Direct Measure HDL  Female:  Greater than or equal to 50 mg/dL   Male:  Greater than or equal to 40 mg/dL    LDL Cholesterol  Desirable:  <100mg/dL  Above Desirable:  100-129 mg/dL   Borderline High:  130-159 mg/dL   High:  160-189 mg/dL   Very High:  >= 190 mg/dL    Non HDL Cholesterol  Desirable:  130 mg/dL  Above Desirable:  130-159 mg/dL  Borderline High:  160-189 mg/dL  High:  190-219 mg/dL  Very High:  Greater than or equal to 220 mg/dL   TSH with free T4 reflex   Result Value Ref Range    TSH 1.60 0.30 - 4.20 uIU/mL   Extra Tube    Narrative    The following orders were created for panel order Extra Tube.  Procedure                               Abnormality         Status                     ---------                               -----------         ------                     Extra Serum Separator  ...[[id number]]                      Final result                 Please view results for these tests on the individual orders.   Extra Serum Separator Tube (SST)   Result Value Ref Range    Hold Specimen JIC    Albumin Random Urine Quantitative with Creat Ratio   Result Value Ref Range    Creatinine Urine mg/dL 33.5 mg/dL    Albumin Urine mg/L <12.0 mg/L    Albumin Urine mg/g Cr         ASSESSMENT / PLAN:       ICD-10-CM    1. Encounter for Medicare annual wellness exam  Z00.00       2. Need for shingles vaccine  Z23 zoster vaccine recombinant adjuvanted (SHINGRIX) injection      3. Type 2 diabetes mellitus without complication, without long-term current use of insulin (H)  E11.9 FOOT EXAM      4. Essential hypertension  I10       5. Hyperlipidemia, unspecified hyperlipidemia type  E78.5       6. Visit for screening mammogram  Z12.31 MA Screening Bilateral w/ Christopher      1. Mammogram ordered.  She declined DEXA and AAA screening.  Last colonoscopy in 2013 was normal.  No further follow-up was recommended due to age greater than 75.  Pap deferred due to age greater than 65.  Tdap last completed 10/21/2019.  COVID booster is up-to-date.  Prevnar and Pneumovax are up-to-date.  Prescription sent to pharmacy for Shingrix.  2. See #1.  3. Stable.  A1c in good range.  Blood pressure is well controlled. Eye exam is up to date.  On aspirin daily.  Non-smoker.  On ACE-I and statin.  Follow up in 3 months.  4. Blood pressure well controlled.  Continue current medications.  5. Lipids in good range.  Continue current medications.  6. See #1.      Patient has been advised of split billing requirements and indicates understanding: Yes      COUNSELING:  Reviewed preventive health counseling, as reflected in patient instructions       Regular exercise       Healthy diet/nutrition       Vision screening       Hearing screening       Aspirin prophylaxis        Osteoporosis prevention/bone health        She reports that she has never  smoked. She has never used smokeless tobacco.      Appropriate preventive services were discussed with this patient, including applicable screening as appropriate for cardiovascular disease, diabetes, osteopenia/osteoporosis, and glaucoma.  As appropriate for age/gender, discussed screening for colorectal cancer, prostate cancer, breast cancer, and cervical cancer. Checklist reviewing preventive services available has been given to the patient.    Reviewed patients plan of care and provided an AVS. The Basic Care Plan (routine screening as documented in Health Maintenance) for Oanh meets the Care Plan requirement. This Care Plan has been established and reviewed with the Patient.      Alexa Mix MD  [facility]    Identified Health Risks:    I have reviewed Opioid Use Disorder and Substance Use Disorder risk factors and made any needed referrals.

## 2023-08-18 DIAGNOSIS — E11.9 TYPE 2 DIABETES MELLITUS WITHOUT COMPLICATION, WITHOUT LONG-TERM CURRENT USE OF INSULIN (H): ICD-10-CM

## 2023-09-20 ENCOUNTER — HOSPITAL ENCOUNTER (OUTPATIENT)
Dept: MAMMOGRAPHY | Facility: OTHER | Age: 83
Discharge: HOME | End: 2023-09-20
Attending: FAMILY MEDICINE | Admitting: FAMILY MEDICINE
Payer: MEDICARE

## 2023-09-20 DIAGNOSIS — Z12.31 VISIT FOR SCREENING MAMMOGRAM: ICD-10-CM

## 2023-09-20 PROCEDURE — 77067 SCR MAMMO BI INCL CAD: CPT

## 2023-09-28 DIAGNOSIS — E78.5 HYPERLIPIDEMIA, UNSPECIFIED HYPERLIPIDEMIA TYPE: ICD-10-CM

## 2023-09-28 DIAGNOSIS — E11.9 TYPE 2 DIABETES MELLITUS WITHOUT COMPLICATION, WITHOUT LONG-TERM CURRENT USE OF INSULIN (H): ICD-10-CM

## 2023-09-28 DIAGNOSIS — I10 ESSENTIAL HYPERTENSION: ICD-10-CM

## 2023-09-28 RX ORDER — LISINOPRIL 10 MG/1
10 TABLET ORAL DAILY
Qty: 90 TABLET | Refills: 3 | Status: SHIPPED | OUTPATIENT
Start: 2023-09-28 | End: 2024-08-09

## 2023-09-28 RX ORDER — SIMVASTATIN 10 MG
10 TABLET ORAL AT BEDTIME
Qty: 90 TABLET | Refills: 3 | Status: SHIPPED | OUTPATIENT
Start: 2023-09-28 | End: 2024-08-09

## 2023-09-28 RX ORDER — BLOOD SUGAR DIAGNOSTIC
STRIP MISCELLANEOUS
Qty: 100 STRIP | Refills: 10 | Status: SHIPPED | OUTPATIENT
Start: 2023-09-28 | End: 2024-08-09

## 2023-09-28 NOTE — TELEPHONE ENCOUNTER
PLEASE CALL PATIENT WITH STATUS.    Requested Prescriptions   Pending Prescriptions Disp Refills    lisinopril (ZESTRIL) 10 MG tablet 90 tablet 3     Sig: Take 1 tablet (10 mg) by mouth daily   Last Prescription Date:   10/3/22  Last Fill Qty/Refills:         90, R-3      ACE Inhibitors (Including Combos) Protocol Failed - 9/28/2023  1:10 PM        Failed - Normal serum creatinine on file in past 12 months     Recent Labs   Lab Test 07/07/23  1227   CR 0.99*   Ok to refill medication if creatinine is low       simvastatin (ZOCOR) 10 MG tablet 90 tablet 3     Sig: Take 1 tablet (10 mg) by mouth At Bedtime   Last Prescription Date:   10/3/22  Last Fill Qty/Refills:         90, R-3        blood glucose (ONETOUCH VERIO IQ) test strip 100 strip 11     Sig: USE  STRIP TO CHECK GLUCOSE ONCE DAILY   Last Prescription Date:   5/22/23  Last Fill Qty/Refills:         100, R-11 ([facility]- needs printed orders to bring to [place])    Murali'd up per last written order.      blood glucose (NO BRAND SPECIFIED) lancets standard 100 each 11     Sig: Use to test blood sugar 1 time daily. Dispense as insurance allows. Dx. Code: E11.9.   Last Prescription Date:   8/19/23  Last Fill Qty/Refills:         100, R-11 ([facility]-  needs printed orders to bring to [place])     There is no refill protocol information for this order  Murali'd up per last written order.        Last Office Visit:              7/7/23  Future Office visit:           None    Per LOV note:  Follow up in 3 months.   Manisha Berry RN .............. 9/28/2023  1:14 PM

## 2023-09-28 NOTE — TELEPHONE ENCOUNTER
Reason for call: Medication or medication refill    Name of medication requested: lisinipril and simvistatin    How many days of medication do you have left? Few days    What pharmacy do you use? [facility]    Preferred method for responding to this message: Telephone Call    Phone number patient can be reached at: Home number on file [phone] (home)    If we cannot reach you directly, may we leave a detailed response at the number you provided? Yes         Pt also is requesting a written script for her diabetic test strips and needles.  Call when ready.     Jennifer Taylor on 9/28/2023 at 1:05 PM

## 2023-09-28 NOTE — TELEPHONE ENCOUNTER
Patient informed refills were sent and printed scripts for lancets and test strips at [facility] for patient to  per her request.     Lesley Parker CMA on 9/28/2023 at 1:52 PM

## 2023-10-06 ENCOUNTER — ALLIED HEALTH/NURSE VISIT (OUTPATIENT)
Dept: FAMILY MEDICINE | Facility: OTHER | Age: 83
End: 2023-10-06
Attending: FAMILY MEDICINE
Payer: MEDICARE

## 2023-10-06 DIAGNOSIS — Z23 HIGH PRIORITY FOR 2019-NCOV VACCINE: ICD-10-CM

## 2023-10-06 DIAGNOSIS — Z23 NEED FOR PROPHYLACTIC VACCINATION AND INOCULATION AGAINST INFLUENZA: Primary | ICD-10-CM

## 2023-10-06 PROCEDURE — 91320 SARSCV2 VAC 30MCG TRS-SUC IM: CPT

## 2023-10-06 PROCEDURE — 90662 IIV NO PRSV INCREASED AG IM: CPT

## 2023-10-27 ENCOUNTER — MEDICAL CORRESPONDENCE (OUTPATIENT)
Dept: HEALTH INFORMATION MANAGEMENT | Facility: OTHER | Age: 83
End: 2023-10-27
Payer: MEDICARE

## 2024-05-21 ENCOUNTER — TRANSFERRED RECORDS (OUTPATIENT)
Dept: HEALTH INFORMATION MANAGEMENT | Facility: OTHER | Age: 84
End: 2024-05-21
Payer: MEDICARE

## 2024-05-21 LAB — RETINOPATHY: NEGATIVE

## 2024-08-09 ENCOUNTER — LAB (OUTPATIENT)
Dept: LAB | Facility: OTHER | Age: 84
End: 2024-08-09
Attending: FAMILY MEDICINE
Payer: MEDICARE

## 2024-08-09 ENCOUNTER — OFFICE VISIT (OUTPATIENT)
Dept: FAMILY MEDICINE | Facility: OTHER | Age: 84
End: 2024-08-09
Attending: FAMILY MEDICINE
Payer: MEDICARE

## 2024-08-09 VITALS
SYSTOLIC BLOOD PRESSURE: 118 MMHG | HEIGHT: 64 IN | RESPIRATION RATE: 16 BRPM | TEMPERATURE: 97.1 F | DIASTOLIC BLOOD PRESSURE: 72 MMHG | OXYGEN SATURATION: 96 % | BODY MASS INDEX: 25.44 KG/M2 | WEIGHT: 149 LBS | HEART RATE: 71 BPM

## 2024-08-09 DIAGNOSIS — N18.31 CHRONIC KIDNEY DISEASE, STAGE 3A (H): ICD-10-CM

## 2024-08-09 DIAGNOSIS — E11.9 TYPE 2 DIABETES MELLITUS WITHOUT COMPLICATION, WITHOUT LONG-TERM CURRENT USE OF INSULIN (H): ICD-10-CM

## 2024-08-09 DIAGNOSIS — E78.5 HYPERLIPIDEMIA, UNSPECIFIED HYPERLIPIDEMIA TYPE: ICD-10-CM

## 2024-08-09 DIAGNOSIS — Z29.11 NEED FOR VACCINATION AGAINST RESPIRATORY SYNCYTIAL VIRUS: ICD-10-CM

## 2024-08-09 DIAGNOSIS — Z00.00 ENCOUNTER FOR MEDICARE ANNUAL WELLNESS EXAM: Primary | ICD-10-CM

## 2024-08-09 DIAGNOSIS — I10 ESSENTIAL HYPERTENSION: ICD-10-CM

## 2024-08-09 LAB
ALBUMIN SERPL BCG-MCNC: 4.3 G/DL (ref 3.5–5.2)
ALP SERPL-CCNC: 70 U/L (ref 40–150)
ALT SERPL W P-5'-P-CCNC: 13 U/L (ref 0–50)
ANION GAP SERPL CALCULATED.3IONS-SCNC: 9 MMOL/L (ref 7–15)
AST SERPL W P-5'-P-CCNC: 19 U/L (ref 0–45)
BASOPHILS # BLD AUTO: 0 10E3/UL (ref 0–0.2)
BASOPHILS NFR BLD AUTO: 1 %
BILIRUB SERPL-MCNC: 0.4 MG/DL
BUN SERPL-MCNC: 17.9 MG/DL (ref 8–23)
CALCIUM SERPL-MCNC: 9.8 MG/DL (ref 8.8–10.4)
CHLORIDE SERPL-SCNC: 101 MMOL/L (ref 98–107)
CHOLEST SERPL-MCNC: 183 MG/DL
CREAT SERPL-MCNC: 1.08 MG/DL (ref 0.51–0.95)
CREAT UR-MCNC: 51.5 MG/DL
EGFRCR SERPLBLD CKD-EPI 2021: 51 ML/MIN/1.73M2
EOSINOPHIL # BLD AUTO: 0.1 10E3/UL (ref 0–0.7)
EOSINOPHIL NFR BLD AUTO: 2 %
ERYTHROCYTE [DISTWIDTH] IN BLOOD BY AUTOMATED COUNT: 11.9 % (ref 10–15)
GLUCOSE SERPL-MCNC: 110 MG/DL (ref 70–99)
HBA1C MFR BLD: 5.4 % (ref 4–6.2)
HCO3 SERPL-SCNC: 26 MMOL/L (ref 22–29)
HCT VFR BLD AUTO: 36.9 % (ref 35–47)
HDLC SERPL-MCNC: 63 MG/DL
HGB BLD-MCNC: 12.5 G/DL (ref 11.7–15.7)
HOLD SPECIMEN: NORMAL
IMM GRANULOCYTES # BLD: 0 10E3/UL
IMM GRANULOCYTES NFR BLD: 0 %
LDLC SERPL CALC-MCNC: 88 MG/DL
LYMPHOCYTES # BLD AUTO: 1.7 10E3/UL (ref 0.8–5.3)
LYMPHOCYTES NFR BLD AUTO: 31 %
MCH RBC QN AUTO: 31.7 PG (ref 26.5–33)
MCHC RBC AUTO-ENTMCNC: 33.9 G/DL (ref 31.5–36.5)
MCV RBC AUTO: 94 FL (ref 78–100)
MICROALBUMIN UR-MCNC: <12 MG/L
MICROALBUMIN/CREAT UR: NORMAL MG/G{CREAT}
MONOCYTES # BLD AUTO: 0.6 10E3/UL (ref 0–1.3)
MONOCYTES NFR BLD AUTO: 11 %
NEUTROPHILS # BLD AUTO: 3 10E3/UL (ref 1.6–8.3)
NEUTROPHILS NFR BLD AUTO: 55 %
NONHDLC SERPL-MCNC: 120 MG/DL
NRBC # BLD AUTO: 0 10E3/UL
NRBC BLD AUTO-RTO: 0 /100
PLATELET # BLD AUTO: 253 10E3/UL (ref 150–450)
POTASSIUM SERPL-SCNC: 4.5 MMOL/L (ref 3.4–5.3)
PROT SERPL-MCNC: 7.6 G/DL (ref 6.4–8.3)
RBC # BLD AUTO: 3.94 10E6/UL (ref 3.8–5.2)
SODIUM SERPL-SCNC: 136 MMOL/L (ref 135–145)
TRIGL SERPL-MCNC: 159 MG/DL
TSH SERPL DL<=0.005 MIU/L-ACNC: 1.87 UIU/ML (ref 0.3–4.2)
WBC # BLD AUTO: 5.4 10E3/UL (ref 4–11)

## 2024-08-09 PROCEDURE — 80053 COMPREHEN METABOLIC PANEL: CPT | Mod: ZL

## 2024-08-09 PROCEDURE — 84443 ASSAY THYROID STIM HORMONE: CPT | Mod: ZL

## 2024-08-09 PROCEDURE — G0439 PPPS, SUBSEQ VISIT: HCPCS | Performed by: FAMILY MEDICINE

## 2024-08-09 PROCEDURE — 82570 ASSAY OF URINE CREATININE: CPT | Mod: ZL | Performed by: FAMILY MEDICINE

## 2024-08-09 PROCEDURE — 83036 HEMOGLOBIN GLYCOSYLATED A1C: CPT | Mod: ZL

## 2024-08-09 PROCEDURE — 85025 COMPLETE CBC W/AUTO DIFF WBC: CPT | Mod: ZL

## 2024-08-09 PROCEDURE — G0463 HOSPITAL OUTPT CLINIC VISIT: HCPCS

## 2024-08-09 PROCEDURE — 99213 OFFICE O/P EST LOW 20 MIN: CPT | Mod: 25 | Performed by: FAMILY MEDICINE

## 2024-08-09 PROCEDURE — 80061 LIPID PANEL: CPT | Mod: ZL

## 2024-08-09 PROCEDURE — 36415 COLL VENOUS BLD VENIPUNCTURE: CPT | Mod: ZL

## 2024-08-09 RX ORDER — LISINOPRIL 10 MG/1
10 TABLET ORAL DAILY
Qty: 90 TABLET | Refills: 3 | Status: SHIPPED | OUTPATIENT
Start: 2024-08-09

## 2024-08-09 RX ORDER — BLOOD SUGAR DIAGNOSTIC
STRIP MISCELLANEOUS
Qty: 100 STRIP | Refills: 10 | Status: SHIPPED | OUTPATIENT
Start: 2024-08-09

## 2024-08-09 RX ORDER — SIMVASTATIN 10 MG
10 TABLET ORAL AT BEDTIME
Qty: 90 TABLET | Refills: 3 | Status: SHIPPED | OUTPATIENT
Start: 2024-08-09

## 2024-08-09 SDOH — HEALTH STABILITY: PHYSICAL HEALTH: ON AVERAGE, HOW MANY DAYS PER WEEK DO YOU ENGAGE IN MODERATE TO STRENUOUS EXERCISE (LIKE A BRISK WALK)?: 3 DAYS

## 2024-08-09 ASSESSMENT — PAIN SCALES - GENERAL: PAINLEVEL: NO PAIN (0)

## 2024-08-09 ASSESSMENT — SOCIAL DETERMINANTS OF HEALTH (SDOH): HOW OFTEN DO YOU GET TOGETHER WITH FRIENDS OR RELATIVES?: MORE THAN THREE TIMES A WEEK

## 2024-08-09 NOTE — NURSING NOTE
Chief Complaint   Patient presents with    Wellness Visit     Previous A1C is at goal of <8  Lab Results   Component Value Date    A1C 5.4 07/07/2023    A1C 5.8 09/30/2022    A1C 5.7 04/28/2022    A1C 5.5 10/07/2021    A1C 5.5 05/17/2021    A1C 5.8 01/07/2021    A1C 6.4 10/06/2020    A1C 6.7 10/11/2018     Urine microalbumin:creatine: DUE  Foot exam 7/7/2023  Eye exam 5/21/2024    Tobacco User NO  Patient is on a daily aspirin  Patient is on a Statin.  Blood pressure today of:     BP Readings from Last 1 Encounters:   08/09/24 118/72      is at the goal of <139/89 for diabetics.      Medication Reconciliation: complete    Lesley Rawls LPN

## 2024-08-09 NOTE — PATIENT INSTRUCTIONS
Patient Education   Preventive Care Advice   This is general advice given by our system to help you stay healthy. However, your care team may have specific advice just for you. Please talk to your care team about your preventive care needs.  Nutrition  Eat 5 or more servings of fruits and vegetables each day.  Try wheat bread, brown rice and whole grain pasta (instead of white bread, rice, and pasta).  Get enough calcium and vitamin D. Check the label on foods and aim for 100% of the RDA (recommended daily allowance).  Lifestyle  Exercise at least 150 minutes each week  (30 minutes a day, 5 days a week).  Do muscle strengthening activities 2 days a week. These help control your weight and prevent disease.  No smoking.  Wear sunscreen to prevent skin cancer.  Have a dental exam and cleaning every 6 months.  Yearly exams  See your health care team every year to talk about:  Any changes in your health.  Any medicines your care team has prescribed.  Preventive care, family planning, and ways to prevent chronic diseases.  Shots (vaccines)   HPV shots (up to age 26), if you've never had them before.  Hepatitis B shots (up to age 59), if you've never had them before.  COVID-19 shot: Get this shot when it's due.  Flu shot: Get a flu shot every year.  Tetanus shot: Get a tetanus shot every 10 years.  Pneumococcal, hepatitis A, and RSV shots: Ask your care team if you need these based on your risk.  Shingles shot (for age 50 and up)  General health tests  Diabetes screening:  Starting at age 35, Get screened for diabetes at least every 3 years.  If you are younger than age 35, ask your care team if you should be screened for diabetes.  Cholesterol test: At age 39, start having a cholesterol test every 5 years, or more often if advised.  Bone density scan (DEXA): At age 50, ask your care team if you should have this scan for osteoporosis (brittle bones).  Hepatitis C: Get tested at least once in your life.  STIs (sexually  transmitted infections)  Before age 24: Ask your care team if you should be screened for STIs.  After age 24: Get screened for STIs if you're at risk. You are at risk for STIs (including HIV) if:  You are sexually active with more than one person.  You don't use condoms every time.  You or a partner was diagnosed with a sexually transmitted infection.  If you are at risk for HIV, ask about PrEP medicine to prevent HIV.  Get tested for HIV at least once in your life, whether you are at risk for HIV or not.  Cancer screening tests  Cervical cancer screening: If you have a cervix, begin getting regular cervical cancer screening tests starting at age 21.  Breast cancer scan (mammogram): If you've ever had breasts, begin having regular mammograms starting at age 40. This is a scan to check for breast cancer.  Colon cancer screening: It is important to start screening for colon cancer at age 45.  Have a colonoscopy test every 10 years (or more often if you're at risk) Or, ask your provider about stool tests like a FIT test every year or Cologuard test every 3 years.  To learn more about your testing options, visit:   .  For help making a decision, visit:   https://bit.ly/hf53675.  Prostate cancer screening test: If you have a prostate, ask your care team if a prostate cancer screening test (PSA) at age 55 is right for you.  Lung cancer screening: If you are a current or former smoker ages 50 to 80, ask your care team if ongoing lung cancer screenings are right for you.  For informational purposes only. Not to replace the advice of your health care provider. Copyright   2023 [facility]. All rights reserved. Clinically reviewed by the [facility] Transitions Program. [employer] [id number] - REV 01/24.  9 Ways to Cut Back on Drinking  Maybe you've found yourself drinking more alcohol than you'd prefer. If you want to cut back, here are some ideas to try.    Think before you drink.  Do you really want a drink,  "or is it just a habit? If you're used to having a drink at a certain time, try doing something else then.     Look for substitutes.  Find some no-alcohol drinks that you enjoy, like flavored seltzer water, tea with honey, or tonic with a slice of lime. Or try alcohol-free beer or \"virgin\" cocktails (without the alcohol).     Drink more water.  Use water to quench your thirst. Drink a glass of water before you have any alcohol. Have another glass along with every drink or between drinks.     Shrink your drink.  For example, have a bottle of beer instead of a pint. Use a smaller glass for wine. Choose drinks with lower alcohol content (ABV%). Or use less liquor and more mixer in cocktails.     Slow down.  It's easy to drink quickly and without thinking about it. Pay attention, and make each drink last longer.     Do the math.  Total up how much you spend on alcohol each month. How much is that a year? If you cut back, what could you do with the money you save?     Take a break.  Choose a day or two each week when you won't drink at all. Notice how you feel on those days, physically and emotionally. How did you sleep? Do you feel better? Over time, add more break days.     Count calories.  Would you like to lose some weight? For some people that's a good motivator for cutting back. Figure out how many calories are in each drink. How many does that add up to in a day? In a week? In a month?     Practice saying no.  Be ready when someone offers you a drink. Try: \"Thanks, I've had enough.\" Or \"Thanks, but I'm cutting back.\" Or \"No, thanks. I feel better when I drink less.\"   Current as of: November 15, 2023  Content Version: 14.1 2006-2024 [employer].   Care instructions adapted under license by your healthcare professional. If you have questions about a medical condition or this instruction, always ask your healthcare professional. [employer] disclaims any warranty or liability for your use " of this information.

## 2024-08-09 NOTE — PROGRESS NOTES
Preventive Care Visit  [facility] AND [facility]  Alexa Mix MD, Family Medicine  Aug 9, 2024        Amol Hugo is a 84 year old, presenting for the following:  Wellness Visit        8/9/2024     9:15 AM   Additional Questions   Roomed by Lesley Rawls         Health Care Directive  Patient does not have a Health Care Directive or Living Will: Patient states has Advance Directive and will bring in a copy to clinic.    RYLEE Hugo is here for a Medicare Wellness Visit.  She also would like to follow up on her history of diabetes.    Diabetes history:  Frequency of checking blood sugars: daily, fasting  Blood sugar range:  Fasting:  usually under 120.  Lunch:  n/a  Supper:  n/a  Bedtime:  n/a  Taking aspirin?   yes  On statin?  Simvastatin   Taking ACEI/ARB?  Lisinopril   Last eye exam:  5/21/24  Eye doctor:  Dr. Glass  Smoking?  no  Interested in cessation?  N/a      Diabetes Follow-up    How often are you checking your blood sugar? One time daily  What time of day are you checking your blood sugars (select all that apply)?   Fasting AM  Have you had any blood sugars above 200?  No  Have you had any blood sugars below 70?  No  What symptoms do you notice when your blood sugar is low?  None  What concerns do you have today about your diabetes? None   Do you have any of these symptoms? (Select all that apply)  No numbness or tingling in feet.  No redness, sores or blisters on feet.  No complaints of excessive thirst.  No reports of blurry vision.  No significant changes to weight.      BP Readings from Last 2 Encounters:   08/09/24 118/72   07/07/23 130/74     Hemoglobin A1C (%)   Date Value   08/09/2024 5.4   07/07/2023 5.4   05/17/2021 5.5   01/07/2021 5.8     LDL Cholesterol Calculated (mg/dL)   Date Value   08/09/2024 88   07/07/2023 89   05/17/2021 79   01/07/2021 91         Annual Wellness Visit     Patient has been advised of split billing requirements and indicates  understanding: Yes       Health Care Directive  Patient does not have a Health Care Directive or Living Will: Patient states has Advance Directive and will bring in a copy to clinic.      8/9/2024   General Health   How would you rate your overall physical health? Good   Feel stress (tense, anxious, or unable to sleep) Only a little             8/9/2024   Nutrition   Diet: Diabetic            8/9/2024   Exercise   Days per week of moderate/strenous exercise 3 days              8/9/2024   Social Factors   Frequency of gathering with friends or relatives More than three times a week   Worry food won't last until get money to buy more No   Food not last or not have enough money for food? No   Do you have housing? (Housing is defined as stable permanent housing and does not include staying ouside in a car, in a tent, in an abandoned building, in an overnight shelter, or couch-surfing.) No   Are you worried about losing your housing? No   Lack of transportation? No   Unable to get utilities (heat,electricity)? No   Want help with housing or utility concern? No      (!) HOUSING CONCERN PRESENT        8/9/2024   Fall Risk   Fallen 2 or more times in the past year? No   Trouble with walking or balance? No             8/9/2024   Activities of Daily Living- Home Safety   Needs help with the following daily activites None of the above   Safety concerns in the home None of the above            8/9/2024   Dental   Dentist two times every year? (!) NO            8/9/2024   Hearing Screening   Hearing concerns? None of the above            8/9/2024   Driving Risk Screening   Patient/family members have concerns about driving (!) DECLINE            8/9/2024   General Alertness/Fatigue Screening   Have you been more tired than usual lately? No            8/9/2024   Urinary Incontinence Screening   Bothered by leaking urine in past 6 months No            8/9/2024   TB Screening   Were you born outside of the [place]? No          Social  History     Tobacco Use    Smoking status: Never    Smokeless tobacco: Never   Vaping Use    Vaping status: Never Used   Substance Use Topics    Alcohol use: Yes    Drug use: Never     Comment: Drug use: No         Today's PHQ-2 Score:       8/9/2024     8:57 AM   PHQ-2 ( 1999 Pfizer)   Q1: Little interest or pleasure in doing things 0   Q2: Feeling down, depressed or hopeless 0   PHQ-2 Score 0   Q1: Little interest or pleasure in doing things Not at all   Q2: Feeling down, depressed or hopeless Not at all   PHQ-2 Score 0           9/20/2023   LAST FHS-7 RESULTS   1st degree relative breast or ovarian cancer No   Any relative bilateral breast cancer No   Any male have breast cancer No   Any ONE woman have BOTH breast AND ovarian cancer No   Any woman with breast cancer before 50yrs No   2 or more relatives with breast AND/OR ovarian cancer No   2 or more relatives with breast AND/OR bowel cancer No           Mammogram Screening - After age 74- determine frequency with patient based on health status, life expectancy and patient goals                Reviewed and updated as needed this visit by Provider                    Past Medical History:   Diagnosis Date    Cataract     inactive icd-9 diagnosis auto replaced with icd-10, display name retained//mporz    Essential (primary) hypertension     No Comments Provided    Hyperlipidemia     No Comments Provided    Osteoarthritis     No Comments Provided    Personal history of other diseases of the female genital tract     Child birth x 3     Past Surgical History:   Procedure Laterality Date    COLONOSCOPY      10/24/2013,diverticulosis-no further colonoscopy rec d/t age    OTHER SURGICAL HISTORY      [id number],OTHER,Bilateral tubal ligation     BP Readings from Last 3 Encounters:   08/09/24 118/72   07/07/23 130/74   04/28/22 136/80    Wt Readings from Last 3 Encounters:   08/09/24 67.6 kg (149 lb)   07/07/23 65.5 kg (144 lb 6.4 oz)   04/28/22 65.1 kg (143 lb 9.6 oz)                   Patient Active Problem List   Diagnosis    Hyperlipidemia, unspecified hyperlipidemia type    Essential hypertension    Hyperglycemia    Type 2 diabetes mellitus without complication, without long-term current use of insulin (H)    Chronic kidney disease, stage 3a (H)     Past Surgical History:   Procedure Laterality Date    COLONOSCOPY      10/24/2013,diverticulosis-no further colonoscopy rec d/t age    OTHER SURGICAL HISTORY      [id number],OTHER,Bilateral tubal ligation       Social History     Tobacco Use    Smoking status: Never    Smokeless tobacco: Never   Substance Use Topics    Alcohol use: Yes     Family History   Problem Relation Age of Onset    Cancer Mother         Cancer, lung cancer    Other - See Comments Father 70        Stroke    Breast Cancer No family hx of         Cancer-breast         Current Outpatient Medications   Medication Sig Dispense Refill    aspirin 81 MG tablet Take 81 mg by mouth daily With a meal      blood glucose (NO BRAND SPECIFIED) lancets standard Use to test blood sugar 1 time daily. Dispense as insurance allows. Dx. Code: E11.9. 100 each 10    blood glucose (NO BRAND SPECIFIED) lancing device Device to be used with lancets.  Diagnosis E11.9. 1 each 0    blood glucose (ONETOUCH VERIO IQ) test strip USE  STRIP TO CHECK GLUCOSE ONCE DAILY 100 strip 10    blood glucose monitoring (NO BRAND SPECIFIED) meter device kit Use to test blood sugar 1 time daily. Dispense as insurance allows. Dx. Code: E11.9. 1 kit 0    Blood Glucose Monitoring Suppl (ONETOUCH VERIO FLEX SYSTEM) w/Device KIT USE TO CHECK GLUCOSE ONCE DAILY      Lancets (ONETOUCH DELICA PLUS [id number]) MISC USE TO CHECK GLUCOSE ONCE DAILY      lisinopril (ZESTRIL) 10 MG tablet Take 1 tablet (10 mg) by mouth daily 90 tablet 3    Multiple Vitamins-Iron (MULTIVITAMIN/IRON PO)       RSV vaccine, bivalent, ABRYSVO, injection Inject 0.5 mLs into the muscle once for 1 dose Pharmacist administered 0.5 mL 0    simvastatin  (ZOCOR) 10 MG tablet Take 1 tablet (10 mg) by mouth at bedtime 90 tablet 3     No Known Allergies  Recent Labs   Lab Test 08/09/24  0830 07/07/23  1227 09/30/22  0729 10/07/21  0732 05/17/21  0735 01/07/21  0856   A1C 5.4 5.4 5.8   < > 5.5 5.8   LDL 88 89 100   < > 79 91   HDL 63 64 66   < > 68 54   TRIG 159* 181* 127   < > 145 154*   ALT 13 13 11   < > 11 11   CR 1.08* 0.99* 1.01   < > 0.97 0.94   GFRESTIMATED 51* 57* 56*   < > 55* 57*   GFRESTBLACK  --   --   --   --  67 70   POTASSIUM 4.5 4.1 4.4   < > 4.1 4.0   TSH 1.87 1.60 1.91   < >  --   --     < > = values in this interval not displayed.        Current providers sharing in care for this patient include:  Patient Care Team:  Alexa Mix MD as PCP - General (Family Medicine)  Alexa Mix MD as Assigned PCP  Kylie Tse RN as Diabetes Educator (Diabetes Education)    The following health maintenance items are reviewed in Epic and correct as of today:  Health Maintenance   Topic Date Due    RSV VACCINE (Pregnancy & 60+) (1 - 1-dose 60+ series) Never done    COVID-19 Vaccine (7 - 2023-24 season) 09/01/2024 (Originally 2/6/2024)    INFLUENZA VACCINE (1) 09/01/2024    A1C  11/09/2024    EYE EXAM  05/21/2025    MEDICARE ANNUAL WELLNESS VISIT  08/09/2025    BMP  08/09/2025    LIPID  08/09/2025    MICROALBUMIN  08/09/2025    DIABETIC FOOT EXAM  08/09/2025    FALL RISK ASSESSMENT  08/09/2025    HEMOGLOBIN  08/09/2025    ADVANCE CARE PLANNING  08/09/2029    DTAP/TDAP/TD IMMUNIZATION (2 - Td or Tdap) 10/21/2029    PHQ-2 (once per calendar year)  Completed    Pneumococcal Vaccine: 65+ Years  Completed    URINALYSIS  Completed    ZOSTER IMMUNIZATION  Completed    IPV IMMUNIZATION  Aged Out    HPV IMMUNIZATION  Aged Out    MENINGITIS IMMUNIZATION  Aged Out    RSV MONOCLONAL ANTIBODY  Aged Out    DEXA  Discontinued       Appropriate preventive services were discussed with this patient, including applicable screening as appropriate for fall  prevention, nutrition, physical activity, Tobacco-use cessation, weight loss and cognition.  Checklist reviewing preventive services available has been given to the patient.          8/9/2024   Mini Cog   Clock Draw Score 2 Normal    2 Normal   3 Item Recall 3 objects recalled   Mini Cog Total Score 5       Multiple values from one day are sorted in reverse-chronological order                  8/9/2024   General Health   How would you rate your overall physical health? Good   Feel stress (tense, anxious, or unable to sleep) Only a little      (!) STRESS CONCERN      8/9/2024   Nutrition   Diet: Diabetic            8/9/2024   Exercise   Days per week of moderate/strenous exercise 3 days            8/9/2024   Social Factors   Frequency of gathering with friends or relatives More than three times a week   Worry food won't last until get money to buy more No   Food not last or not have enough money for food? No   Do you have housing? (Housing is defined as stable permanent housing and does not include staying ouside in a car, in a tent, in an abandoned building, in an overnight shelter, or couch-surfing.) No   Are you worried about losing your housing? No   Lack of transportation? No   Unable to get utilities (heat,electricity)? No   Want help with housing or utility concern? No      (!) HOUSING CONCERN PRESENT      8/9/2024   Fall Risk   Fallen 2 or more times in the past year? No   Trouble with walking or balance? No             8/9/2024   Activities of Daily Living- Home Safety   Needs help with the following daily activites None of the above   Safety concerns in the home None of the above            8/9/2024   Dental   Dentist two times every year? (!) NO            8/9/2024   Hearing Screening   Hearing concerns? None of the above            8/9/2024   Driving Risk Screening   Patient/family members have concerns about driving (!) DECLINE            8/9/2024   General Alertness/Fatigue Screening   Have you been  more tired than usual lately? No            8/9/2024   Urinary Incontinence Screening   Bothered by leaking urine in past 6 months No            8/9/2024   TB Screening   Were you born outside of the [place]? No            Today's PHQ-2 Score:       8/9/2024     8:57 AM   PHQ-2 ( 1999 Pfizer)   Q1: Little interest or pleasure in doing things 0   Q2: Feeling down, depressed or hopeless 0   PHQ-2 Score 0   Q1: Little interest or pleasure in doing things Not at all   Q2: Feeling down, depressed or hopeless Not at all   PHQ-2 Score 0           8/9/2024   Substance Use   Alcohol more than 3/day or more than 7/wk Yes   How often do you have a drink containing alcohol 2 to 3 times a week   How many alcohol drinks on typical day 1 or 2   How often do you have 5+ drinks at one occasion Never   Audit 2/3 Score 0   How often not able to stop drinking once started Never   How often failed to do what normally expected Never   How often needed first drink in am after a heavy drinking session Never   How often feeling of guilt or remorse after drinking Never   How often unable to remember what happened the night before Never   Have you or someone else been injured because of your drinking No   Has anyone been concerned or suggested you cut down on drinking No   TOTAL SCORE - AUDIT 3   Do you have a current opioid prescription? No   How severe/bad is pain from 1 to 10? 0/10 (No Pain)   Do you use any other substances recreationally? No        Social History     Tobacco Use    Smoking status: Never    Smokeless tobacco: Never   Vaping Use    Vaping status: Never Used   Substance Use Topics    Alcohol use: Yes    Drug use: Never     Comment: Drug use: No           9/20/2023   LAST FHS-7 RESULTS   1st degree relative breast or ovarian cancer No   Any relative bilateral breast cancer No   Any male have breast cancer No   Any ONE woman have BOTH breast AND ovarian cancer No   Any woman with breast cancer before 50yrs No   2 or more relatives  "with breast AND/OR ovarian cancer No   2 or more relatives with breast AND/OR bowel cancer No                         Reviewed and updated as needed this visit by Provider                      Current providers sharing in care for this patient include:  Patient Care Team:  Alexa Mix MD as PCP - General (Family Medicine)  Alexa Mix MD as Assigned PCP  Kylie Tse RN as Diabetes Educator (Diabetes Education)    The following health maintenance items are reviewed in Epic and correct as of today:  Health Maintenance   Topic Date Due    RSV VACCINE (Pregnancy & 60+) (1 - 1-dose 60+ series) Never done    COVID-19 Vaccine (7 - 2023-24 season) 09/01/2024 (Originally 2/6/2024)    INFLUENZA VACCINE (1) 09/01/2024    A1C  11/09/2024    EYE EXAM  05/21/2025    MEDICARE ANNUAL WELLNESS VISIT  08/09/2025    BMP  08/09/2025    LIPID  08/09/2025    MICROALBUMIN  08/09/2025    DIABETIC FOOT EXAM  08/09/2025    FALL RISK ASSESSMENT  08/09/2025    HEMOGLOBIN  08/09/2025    ADVANCE CARE PLANNING  08/09/2029    DTAP/TDAP/TD IMMUNIZATION (2 - Td or Tdap) 10/21/2029    PHQ-2 (once per calendar year)  Completed    Pneumococcal Vaccine: 65+ Years  Completed    URINALYSIS  Completed    ZOSTER IMMUNIZATION  Completed    IPV IMMUNIZATION  Aged Out    HPV IMMUNIZATION  Aged Out    MENINGITIS IMMUNIZATION  Aged Out    RSV MONOCLONAL ANTIBODY  Aged Out    DEXA  Discontinued         Review of Systems  Constitutional, HEENT, cardiovascular, pulmonary, GI, , musculoskeletal, neuro, skin, endocrine and psych systems are negative, except as otherwise noted.     Objective    Exam  /72   Pulse 71   Temp 97.1  F (36.2  C) (Tympanic)   Resp 16   Ht 1.613 m (5' 3.5\")   Wt 67.6 kg (149 lb)   LMP  (LMP Unknown)   SpO2 96%   Breastfeeding No   BMI 25.98 kg/m     Estimated body mass index is 25.98 kg/m  as calculated from the following:    Height as of this encounter: 1.613 m (5' 3.5\").    Weight as of this " encounter: 67.6 kg (149 lb).    Physical Exam  Vitals and nursing note reviewed.   Constitutional:       General: She is not in acute distress.     Appearance: Normal appearance. She is well-developed.   HENT:      Head: Normocephalic.      Right Ear: Tympanic membrane and external ear normal.      Left Ear: Tympanic membrane and external ear normal.      Nose: Nose normal.      Mouth/Throat:      Mouth: Mucous membranes are moist.      Pharynx: Oropharynx is clear. No oropharyngeal exudate or posterior oropharyngeal erythema.   Eyes:      General:         Right eye: No discharge.         Left eye: No discharge.      Extraocular Movements: Extraocular movements intact.      Conjunctiva/sclera: Conjunctivae normal.      Pupils: Pupils are equal, round, and reactive to light.   Neck:      Thyroid: No thyromegaly.      Trachea: No tracheal deviation.   Cardiovascular:      Rate and Rhythm: Normal rate and regular rhythm.      Pulses: Normal pulses.           Dorsalis pedis pulses are 2+ on the right side and 2+ on the left side.      Heart sounds: Normal heart sounds, S1 normal and S2 normal. No murmur heard.     No friction rub. No gallop. No S3 or S4 sounds.   Pulmonary:      Effort: Pulmonary effort is normal. No respiratory distress.      Breath sounds: Normal breath sounds. No wheezing or rales.      Comments: Breast exam:  No masses palpable bilaterally.  No skin changes, tethering or axillary lymphadenopathy bilaterally.    Abdominal:      General: Abdomen is flat. Bowel sounds are normal. There is no distension.      Palpations: Abdomen is soft. There is no mass.      Tenderness: There is no abdominal tenderness.   Genitourinary:     Comments: Pelvic/Rectal exams deferred per patient.  Musculoskeletal:         General: Normal range of motion.      Cervical back: Normal range of motion and neck supple.      Right foot: Normal. Normal capillary refill. No swelling or deformity.      Left foot: Normal. Normal  capillary refill. No swelling or deformity.   Feet:      Right foot:      Protective Sensation: 5 sites tested.  5 sites sensed.      Skin integrity: Skin integrity normal.      Toenail Condition: Right toenails are normal.      Left foot:      Protective Sensation: 5 sites tested.  5 sites sensed.      Skin integrity: Skin integrity normal.      Toenail Condition: Left toenails are normal.      Comments: Diabetic foot exam completed.  Lymphadenopathy:      Cervical: No cervical adenopathy.   Skin:     General: Skin is warm and dry.      Findings: No rash.   Neurological:      Mental Status: She is alert and oriented to person, place, and time.      Motor: No abnormal muscle tone.      Deep Tendon Reflexes: Reflexes are normal and symmetric.   Psychiatric:         Mood and Affect: Mood normal.         Thought Content: Thought content normal.         Judgment: Judgment normal.               8/9/2024   Mini Cog   Clock Draw Score 2 Normal    2 Normal   3 Item Recall 3 objects recalled   Mini Cog Total Score 5       Multiple values from one day are sorted in reverse-chronological order            Results for orders placed or performed in visit on 08/09/24   Albumin Random Urine Quantitative with Creat Ratio     Status: None   Result Value Ref Range    Creatinine Urine mg/dL 51.5 mg/dL    Albumin Urine mg/L <12.0 mg/L    Albumin Urine mg/g Cr     Results for orders placed or performed in visit on 08/09/24   Extra Tube     Status: None    Narrative    The following orders were created for panel order Extra Tube.  Procedure                               Abnormality         Status                     ---------                               -----------         ------                     Extra Green Top (Lithium...[[id number]]                      Final result               Extra Purple Top Tube[[id number]]                            Final result               Extra Purple Top Tube[[id number]]                            Final  result                 Please view results for these tests on the individual orders.   Extra Green Top (Lithium Heparin) Tube     Status: None   Result Value Ref Range    Hold Specimen JIC    Extra Purple Top Tube     Status: None   Result Value Ref Range    Hold Specimen JIC    Extra Purple Top Tube     Status: None   Result Value Ref Range    Hold Specimen JIC    TSH Reflex GH     Status: Normal   Result Value Ref Range    TSH 1.87 0.30 - 4.20 uIU/mL   Comprehensive Metabolic Panel     Status: Abnormal   Result Value Ref Range    Sodium 136 135 - 145 mmol/L    Potassium 4.5 3.4 - 5.3 mmol/L    Carbon Dioxide (CO2) 26 22 - 29 mmol/L    Anion Gap 9 7 - 15 mmol/L    Urea Nitrogen 17.9 8.0 - 23.0 mg/dL    Creatinine 1.08 (H) 0.51 - 0.95 mg/dL    GFR Estimate 51 (L) >60 mL/min/1.73m2    Calcium 9.8 8.8 - 10.4 mg/dL    Chloride 101 98 - 107 mmol/L    Glucose 110 (H) 70 - 99 mg/dL    Alkaline Phosphatase 70 40 - 150 U/L    AST 19 0 - 45 U/L    ALT 13 0 - 50 U/L    Protein Total 7.6 6.4 - 8.3 g/dL    Albumin 4.3 3.5 - 5.2 g/dL    Bilirubin Total 0.4 <=1.2 mg/dL   CBC with platelets and differential     Status: None   Result Value Ref Range    WBC Count 5.4 4.0 - 11.0 10e3/uL    RBC Count 3.94 3.80 - 5.20 10e6/uL    Hemoglobin 12.5 11.7 - 15.7 g/dL    Hematocrit 36.9 35.0 - 47.0 %    MCV 94 78 - 100 fL    MCH 31.7 26.5 - 33.0 pg    MCHC 33.9 31.5 - 36.5 g/dL    RDW 11.9 10.0 - 15.0 %    Platelet Count 253 150 - 450 10e3/uL    % Neutrophils 55 %    % Lymphocytes 31 %    % Monocytes 11 %    % Eosinophils 2 %    % Basophils 1 %    % Immature Granulocytes 0 %    NRBCs per 100 WBC 0 <1 /100    Absolute Neutrophils 3.0 1.6 - 8.3 10e3/uL    Absolute Lymphocytes 1.7 0.8 - 5.3 10e3/uL    Absolute Monocytes 0.6 0.0 - 1.3 10e3/uL    Absolute Eosinophils 0.1 0.0 - 0.7 10e3/uL    Absolute Basophils 0.0 0.0 - 0.2 10e3/uL    Absolute Immature Granulocytes 0.0 <=0.4 10e3/uL    Absolute NRBCs 0.0 10e3/uL   Lipid Panel     Status: Abnormal    Result Value Ref Range    Cholesterol 183 <200 mg/dL    Triglycerides 159 (H) <150 mg/dL    Direct Measure HDL 63 >=50 mg/dL    LDL Cholesterol Calculated 88 <=100 mg/dL    Non HDL Cholesterol 120 <130 mg/dL    Narrative    Cholesterol  Desirable:  <200 mg/dL    Triglycerides  Normal:  Less than 150 mg/dL  Borderline High:  150-199 mg/dL  High:  200-499 mg/dL  Very High:  Greater than or equal to 500 mg/dL    Direct Measure HDL  Female:  Greater than or equal to 50 mg/dL   Male:  Greater than or equal to 40 mg/dL    LDL Cholesterol  Desirable:  <100mg/dL  Above Desirable:  100-129 mg/dL   Borderline High:  130-159 mg/dL   High:  160-189 mg/dL   Very High:  >= 190 mg/dL    Non HDL Cholesterol  Desirable:  130 mg/dL  Above Desirable:  130-159 mg/dL  Borderline High:  160-189 mg/dL  High:  190-219 mg/dL  Very High:  Greater than or equal to 220 mg/dL   Hemoglobin A1c     Status: Normal   Result Value Ref Range    Hemoglobin A1C 5.4 4.0 - 6.2 %   CBC and Differential     Status: None    Narrative    The following orders were created for panel order CBC and Differential.  Procedure                               Abnormality         Status                     ---------                               -----------         ------                     CBC with platelets and d...[[id number]]                      Final result                 Please view results for these tests on the individual orders.        ICD-10-CM    1. Encounter for Medicare annual wellness exam  Z00.00       2. Need for vaccination against respiratory syncytial virus  Z29.11 RSV vaccine, bivalent, ABRYSVO, injection      3. Type 2 diabetes mellitus without complication, without long-term current use of insulin (H)  E11.9 Hemoglobin A1c     Lipid Panel     CBC and Differential     Comprehensive Metabolic Panel     blood glucose (NO BRAND SPECIFIED) lancets standard     blood glucose (ONETOUCH VERIO IQ) test strip     TSH Reflex GH     Albumin Random Urine  Quantitative with Creat Ratio     lisinopril (ZESTRIL) 10 MG tablet     Albumin Random Urine Quantitative with Creat Ratio      4. Essential hypertension  I10 lisinopril (ZESTRIL) 10 MG tablet      5. Hyperlipidemia, unspecified hyperlipidemia type  E78.5 simvastatin (ZOCOR) 10 MG tablet      6. Chronic kidney disease, stage 3a (H)  N18.31           Mammogram last completed 9/20/2023.  She will schedule her next when she gets a reminder from the radiology department.  She declined DEXA scan and AAA screening.  Colonoscopy deferred due to age greater than 75.  Vaccines reviewed and are up to date other than RSV vaccine.  Order for this sent to pharmacy.  See #1  Stable.  A1c in good range.  Blood pressure is well controlled. Eye exam is up to date.  On aspirin daily.  Non-smoker.  On ACE-I and statin.  Follow up yearly.  Blood pressure well controlled.  Continue current medications.  Lipids in good range.  Continue current medications.  Stable.  Labs as above.    No follow-ups on file.           Alexa Mix MD

## 2024-08-13 ENCOUNTER — MEDICAL CORRESPONDENCE (OUTPATIENT)
Dept: HEALTH INFORMATION MANAGEMENT | Facility: OTHER | Age: 84
End: 2024-08-13
Payer: MEDICARE

## 2024-09-18 DIAGNOSIS — E11.9 TYPE 2 DIABETES MELLITUS WITHOUT COMPLICATION, WITHOUT LONG-TERM CURRENT USE OF INSULIN (H): ICD-10-CM

## 2024-09-18 DIAGNOSIS — I10 ESSENTIAL HYPERTENSION: ICD-10-CM

## 2024-09-20 NOTE — TELEPHONE ENCOUNTER
[facility] sent Rx request for the following:      Requested Prescriptions   Pending Prescriptions Disp Refills    lisinopril (ZESTRIL) 10 MG tablet [Pharmacy Med Name: Lisinopril 10 MG Oral Tablet] 90 tablet 0     Sig: Take 1 tablet by mouth once daily       ACE Inhibitors (Including Combos) Protocol Passed - 9/20/2024  9:16 AM        Passed - Blood pressure under 140/90 in past 12 months- Clinicial or Patient Reported     BP Readings from Last 3 Encounters:   08/09/24 118/72   07/07/23 130/74   04/28/22 136/80       No data recorded            Passed - Medication is active on med list        Passed - Medication indicated for associated diagnosis     Medication is associated with one or more of the following diagnoses:     Chronic Kidney Disease (CKD)   Coronary Artery Disease (CAD)   Diabetes   Heart Failure (HF)   Hypertension (HTN)   Nephropathy   History of myocarditis   Tachycardia induced cardiomyopathy   STEMI (ST elevation myocardial infarction)   Spontaneous dissection of coronary artery   Status post percutaneous transluminal coronary angioplasty            Passed - Recent (12 mo) or future (90 days) visit within the authorizing provider's specialty     The patient must have completed an in-person or virtual visit within the past 12 months or has a future visit scheduled within the next 90 days with the authorizing provider s specialty.  Urgent care and e-visits do not quality as an office visit for this protocol.          Passed - Most recent GFR on file in the past 12 months >30        Passed - Patient is age 18 or older        Passed - No active pregnancy on record        Passed - Normal serum potassium on file in past 12 months     Recent Labs   Lab Test 08/09/24  0830   POTASSIUM 4.5             Passed - No positive pregnancy test within past 12 months             Last Prescription Date:   8/9/24  Last Fill Qty/Refills:         90, R-3    Last Office Visit:              8/9/24   Future Office  visit:           None    Called and left voice message for patient to return call. Calling in regard to refill above. Patient has refills at [facility]. Wanting to know if patient wants those refills transferred to [facility].     Fortunato Adams RN on 9/20/2024 at 9:20 AM

## 2024-09-24 RX ORDER — LISINOPRIL 10 MG/1
10 TABLET ORAL DAILY
Qty: 90 TABLET | Refills: 0 | OUTPATIENT
Start: 2024-09-24

## 2024-09-24 NOTE — TELEPHONE ENCOUNTER
Called and spoke to patient after verifying last name and date of birth. Informed patient that she had refills at [facility] and [facility] is requesting. Patient states she wants her refills at [facility]. Informed patient she has refills at [facility]. Patient had no other questions or concerns.     Fortunato Adams RN on 9/24/2024 at 4:07 PM

## 2024-10-14 ENCOUNTER — TELEPHONE (OUTPATIENT)
Dept: FAMILY MEDICINE | Facility: OTHER | Age: 84
End: 2024-10-14
Payer: MEDICARE

## 2024-10-14 DIAGNOSIS — E11.9 TYPE 2 DIABETES MELLITUS WITHOUT COMPLICATION, WITHOUT LONG-TERM CURRENT USE OF INSULIN (H): ICD-10-CM

## 2024-10-14 RX ORDER — LANCETS 33 GAUGE
1 EACH MISCELLANEOUS DAILY
Qty: 100 EACH | Refills: 4 | Status: SHIPPED | OUTPATIENT
Start: 2024-10-14

## 2024-10-14 RX ORDER — BLOOD SUGAR DIAGNOSTIC
STRIP MISCELLANEOUS
Qty: 100 STRIP | Refills: 10 | Status: SHIPPED | OUTPATIENT
Start: 2024-10-14

## 2024-10-14 NOTE — TELEPHONE ENCOUNTER
Called and verified patient full name and . Patient needs new Meter Device. Order pending. [facility]. Currently using [employer] brand.     Also needs paper script for traveling to [place]. Will  when complete.     Lesley Rawls LPN on 10/14/2024 at 2:42 PM

## 2024-10-14 NOTE — TELEPHONE ENCOUNTER
Signed the meter order; paper script still needs to be written for needles and test strips...    Lesley Thapa NP on 10/14/2024 at 4:37 PM

## 2024-10-14 NOTE — TELEPHONE ENCOUNTER
The patient needs a new reader for her diabetes testing.  Also would like written new prescription for needles and test strips before they go for the winter.

## 2024-10-15 NOTE — TELEPHONE ENCOUNTER
Called and verified patient full name and . Notified patient of below.     Lesley Rawls LPN on 10/15/2024 at 8:19 AM

## 2024-10-17 ENCOUNTER — IMMUNIZATION (OUTPATIENT)
Dept: FAMILY MEDICINE | Facility: OTHER | Age: 84
End: 2024-10-17
Attending: FAMILY MEDICINE
Payer: MEDICARE

## 2024-10-17 DIAGNOSIS — Z23 NEED FOR COVID-19 VACCINE: Primary | ICD-10-CM

## 2024-10-17 DIAGNOSIS — Z23 NEED FOR PROPHYLACTIC VACCINATION AND INOCULATION AGAINST INFLUENZA: ICD-10-CM

## 2024-10-17 DIAGNOSIS — E78.5 HYPERLIPIDEMIA, UNSPECIFIED HYPERLIPIDEMIA TYPE: ICD-10-CM

## 2024-10-17 PROCEDURE — G0008 ADMIN INFLUENZA VIRUS VAC: HCPCS

## 2024-10-17 PROCEDURE — 90480 ADMN SARSCOV2 VAC 1/ONLY CMP: CPT

## 2024-10-17 PROCEDURE — 91320 SARSCV2 VAC 30MCG TRS-SUC IM: CPT

## 2024-10-18 DIAGNOSIS — E11.9 TYPE 2 DIABETES MELLITUS WITHOUT COMPLICATION, WITHOUT LONG-TERM CURRENT USE OF INSULIN (H): ICD-10-CM

## 2024-10-22 RX ORDER — SIMVASTATIN 10 MG
10 TABLET ORAL AT BEDTIME
Qty: 90 TABLET | Refills: 0 | OUTPATIENT
Start: 2024-10-22

## 2024-10-22 NOTE — TELEPHONE ENCOUNTER
Disp Refills Start End KEVIN   simvastatin (ZOCOR) 10 MG tablet 90 tablet 3 8/9/2024 -- No   Sig - Route: Take 1 tablet (10 mg) by mouth at bedtime   To [facility]    [facility] requesting    Called patient and patient states that she uses [facility] and she already picked up her Simvastatin from [facility].    Request denied.    Lesvia Gibbons RN on 10/22/2024 at 2:47 PM

## 2024-11-27 ENCOUNTER — TELEPHONE (OUTPATIENT)
Dept: FAMILY MEDICINE | Facility: OTHER | Age: 84
End: 2024-11-27
Payer: MEDICARE

## 2024-11-27 NOTE — TELEPHONE ENCOUNTER
Form received from [facility], regarding glucose test strips and lancets. Chart accessed to gather information needed to fill out form. [facility] Manisha Berry RN .............. 11/27/2024  4:38 PM

## 2025-02-24 NOTE — PROGRESS NOTES
SUBJECTIVE:   CC: Oanh Bowen is an 80 year old woman who presents for preventive health visit.       Patient has been advised of split billing requirements and indicates understanding: Yes       HPI    Annual Wellness Visit    Oanh had been to clinic about a year ago and had an elevated fasting glucose of 133.  It had been recommended that she return to clinic for a repeat fasting glucose as well as an A1c to confirm whether or not she had diabetes.  She had gone south for the winter to [place] and therefore did not return for labs at that time.  Later in the spring, the covid-19 epidemic was in full swing and she had delayed having lab work at that time as well.  She had labs today, which showed her fasting glucose was 146 and an A1c of 6.4.  Reviewing her last 2 years' worth of labs, discussed that she has likely been diabetic for at least 2 years, but at least with an A1c of 6.4 at this time, she is a well-controlled diet-controlled diabetic.  She does not have testing supplies at this time.    Frequency of checking blood sugars: n/a  Blood sugar range:  Fasting:  n/a  Lunch:  n/a  Supper:  n/a  Bedtime:  n/a  Lows under 70?  n/a  Highs above 200?  n/a  Symptoms of hypoglycemia?   no  Sores on feet?  no  Numbness/Paresthesias?  no  Excessive thirst or urination?   no  Unintended weight loss or gain?  no  Taking aspirin?   yes  On statin?  yes  Taking ACEI/ARB?  no  Last eye exam:  More than a year ago  Eye doctor:  Leonard  Smoking?  no  Interested in cessation?  n/a      Patient has been advised of split billing requirements and indicates understanding: Yes     Are you in the first 12 months of your Medicare Part B coverage?  No    Physical Health:    In general, how would you rate your overall physical health? good    Outside of work, how many days during the week do you exercise?none    Outside of work, approximately how many minutes a day do you exercise?not applicable    If you drink alcohol  "do you typically have >3 drinks per day or >7 drinks per week? No    Do you usually eat at least 4 servings of fruit and vegetables a day, include whole grains & fiber and avoid regularly eating high fat or \"junk\" foods? NO    Do you have any problems taking medications regularly? No    Do you have any side effects from medications? none    Needs assistance for the following daily activities: no assistance needed    Which of the following safety concerns are present in your home?  throw rugs in the hallway and lack of grab bars in the bathroom     Hearing impairment: No    In the past 6 months, have you been bothered by leaking of urine? no    Mental Health:    In general, how would you rate your overall mental or emotional health? excellent  PHQ-2 Score: 0    Do you feel safe in your environment? Yes    Have you ever done Advance Care Planning? (For example, a Health Directive, POLST, or a discussion with a medical provider or your loved ones about your wishes)? Yes, advance care planning is on file.    Fall risk:  Fallen 2 or more times in the past year?: No  Any fall with injury in the past year?: No    Cognitive Screenin) Repeat 3 items (Leader, Season, Table)    2) Clock draw: NORMAL  3) 3 item recall: Recalls 2 objects   Results: NORMAL clock, 1-2 items recalled: COGNITIVE IMPAIRMENT LESS LIKELY    Mini-CogTM Copyright KIAN Beck. Licensed by the author for use in [facility]; reprinted with permission ([email or web address]). All rights reserved.      Do you have sleep apnea, excessive snoring or daytime drowsiness?: yes    Current providers sharing in care for this patient include:   Patient Care Team:  No Ref-Primary, Physician as PCP - General  Alexa Mix MD as Assigned PCP    Patient has been advised of split billing requirements and indicates understanding: Yes    Today's PHQ-2 Score:   PHQ-2 (  [employer]) 10/6/2020   Q1: Little interest or pleasure in doing things 0   Q2: Feeling " down, depressed or hopeless 0   PHQ-2 Score 0       Abuse: Current or Past (Physical, Sexual or Emotional) - No  Do you feel safe in your environment? Yes    Have you ever done Advance Care Planning? (For example, a Health Directive, POLST, or a discussion with a medical provider or your loved ones about your wishes): Yes, patient states has an Advance Care Planning document and will bring a copy to the clinic.    Social History     Tobacco Use     Smoking status: Never Smoker     Smokeless tobacco: Never Used   Substance Use Topics     Alcohol use: Yes         No flowsheet data found.    Reviewed orders with patient.  Reviewed health maintenance and updated orders accordingly - Yes  Lab work is in process  BP Readings from Last 3 Encounters:   10/06/20 120/74   10/07/19 120/80   10/11/18 138/78    Wt Readings from Last 3 Encounters:   10/06/20 67.7 kg (149 lb 4 oz)   10/07/19 67 kg (147 lb 12.8 oz)   10/11/18 70.2 kg (154 lb 12.8 oz)                  Patient Active Problem List   Diagnosis     Hyperlipidemia, unspecified hyperlipidemia type     Essential hypertension     Hyperglycemia     Past Surgical History:   Procedure Laterality Date     COLONOSCOPY      10/24/2013,diverticulosis-no further colonoscopy rec d/t age     OTHER SURGICAL HISTORY      395245,OTHER,Bilateral tubal ligation       Social History     Tobacco Use     Smoking status: Never Smoker     Smokeless tobacco: Never Used   Substance Use Topics     Alcohol use: Yes     Family History   Problem Relation Age of Onset     Cancer Mother         Cancer, lung cancer     Other - See Comments Father 70        Stroke     Breast Cancer No family hx of         Cancer-breast         Current Outpatient Medications   Medication Sig Dispense Refill     aspirin 81 MG tablet Take 81 mg by mouth daily With a meal       blood glucose (NO BRAND SPECIFIED) lancets standard Use to test blood sugar 1 times daily or as directed. 100 each 11     blood glucose (NO BRAND  SPECIFIED) lancing device Device to be used with lancets. 1 each 0     blood glucose (NO BRAND SPECIFIED) test strip Use to test blood sugar 1 times daily or as directed. 100 each 11     blood glucose monitoring (NO BRAND SPECIFIED) meter device kit Use to test blood sugar 1 times daily or as directed. 1 kit 0     lisinopril (ZESTRIL) 10 MG tablet Take 1 tablet (10 mg) by mouth daily 90 tablet 3     Multiple Vitamins-Iron (MULTIVITAMIN/IRON PO)        simvastatin (ZOCOR) 10 MG tablet Take 1 tablet (10 mg) by mouth At Bedtime 90 tablet 3     No Known Allergies  Recent Labs   Lab Test 10/06/20  0827 10/07/19  1136 10/11/18  1136   A1C 6.4*  --  6.7*   LDL 85 89 99   HDL 53 62 53   TRIG 226* 182* 222*   ALT 15 14 19   CR 0.96 0.92 0.94   GFRESTIMATED 56* 59* 58*   GFRESTBLACK 68 71 70   POTASSIUM 3.9 3.6 4.3        Mammo discussed.    Pertinent mammograms are reviewed under the imaging tab.  History of abnormal Pap smear: NO - age 66 - see link Cervical Cytology Screening Guidelines     Reviewed and updated as needed this visit by clinical staff  Tobacco  Allergies  Meds   Med Hx  Surg Hx  Fam Hx  Soc Hx        Reviewed and updated as needed this visit by Provider                Past Medical History:   Diagnosis Date     Cataract     inactive icd-9 diagnosis auto replaced with icd-10, display name retained//mporz     Essential (primary) hypertension     No Comments Provided     Hyperlipidemia     No Comments Provided     Osteoarthritis     No Comments Provided     Personal history of other diseases of the female genital tract     Child birth x 3      Past Surgical History:   Procedure Laterality Date     COLONOSCOPY      10/24/2013,diverticulosis-no further colonoscopy rec d/t age     OTHER SURGICAL HISTORY      [id number],OTHER,Bilateral tubal ligation       Review of Systems  CONSTITUTIONAL: NEGATIVE for fever, chills, change in weight  INTEGUMENTARY/SKIN: NEGATIVE for worrisome rashes, moles or lesions  EYES:  "NEGATIVE for vision changes or irritation  ENT: NEGATIVE for ear, mouth and throat problems  RESP: NEGATIVE for significant cough or SOB  BREAST: NEGATIVE for masses, tenderness or discharge  CV: NEGATIVE for chest pain, palpitations or peripheral edema  GI: NEGATIVE for nausea, abdominal pain, heartburn, or change in bowel habits  : NEGATIVE for unusual urinary or vaginal symptoms. No vaginal bleeding.  MUSCULOSKELETAL: NEGATIVE for significant arthralgias or myalgia  NEURO: NEGATIVE for weakness, dizziness or paresthesias  PSYCHIATRIC: NEGATIVE for changes in mood or affect      OBJECTIVE:   /74 (BP Location: Right arm, Patient Position: Sitting, Cuff Size: Adult Large)   Pulse 77   Temp 96.8  F (36  C) (Tympanic)   Resp 18   Ht 1.613 m (5' 3.5\")   Wt 67.7 kg (149 lb 4 oz)   LMP  (LMP Unknown)   SpO2 96%   Breastfeeding No   BMI 26.02 kg/m    Physical Exam  GENERAL APPEARANCE: healthy, alert and no distress  EYES: Eyes grossly normal to inspection, PERRL and conjunctivae and sclerae normal  HENT: ear canals and TM's normal, nose and mouth without ulcers or lesions, oropharynx clear and oral mucous membranes moist  NECK: no adenopathy, no asymmetry, masses, or scars and thyroid normal to palpation  RESP: lungs clear to auscultation - no rales, rhonchi or wheezes  BREAST: normal without masses, tenderness or nipple discharge and no palpable axillary masses or adenopathy  CV: regular rate and rhythm, normal S1 S2, no S3 or S4, no murmur, click or rub, no peripheral edema and peripheral pulses strong  ABDOMEN: soft, nontender, no hepatosplenomegaly, no masses and bowel sounds normal  MS: no musculoskeletal defects are noted and gait is age appropriate without ataxia  SKIN: no suspicious lesions or rashes.  No lesions on feet.  NEURO: Normal strength and tone, sensory exam grossly normal, mentation intact and speech normal.  Normal monofilament exam in feet bilaterally.  PSYCH: mentation appears normal " and affect normal/bright    Diagnostic Test Results:  Results for orders placed or performed in visit on 10/06/20 (from the past 24 hour(s))   TSH Reflex GH   Result Value Ref Range    TSH Reflex 2.23 0.34 - 5.60 IU/mL   Lipid Profile   Result Value Ref Range    Cholesterol 183 <200 mg/dL    Triglycerides 226 (H) <150 mg/dL    HDL Cholesterol 53 23 - 92 mg/dL    LDL Cholesterol Calculated 85 <100 mg/dL    Non HDL Cholesterol 130 (H) <130 mg/dL   Comprehensive metabolic panel   Result Value Ref Range    Sodium 138 134 - 144 mmol/L    Potassium 3.9 3.5 - 5.1 mmol/L    Chloride 100 98 - 107 mmol/L    Carbon Dioxide 27 21 - 31 mmol/L    Anion Gap 11 3 - 14 mmol/L    Glucose 146 (H) 70 - 105 mg/dL    Urea Nitrogen 16 7 - 25 mg/dL    Creatinine 0.96 0.60 - 1.20 mg/dL    GFR Estimate 56 (L) >60 mL/min/[1.73_m2]    GFR Estimate If Black 68 >60 mL/min/[1.73_m2]    Calcium 9.8 8.6 - 10.3 mg/dL    Bilirubin Total 0.7 0.3 - 1.0 mg/dL    Albumin 4.3 3.5 - 5.7 g/dL    Protein Total 7.2 6.4 - 8.9 g/dL    Alkaline Phosphatase 53 34 - 104 U/L    ALT 15 7 - 52 U/L    AST 16 13 - 39 U/L     Lab Results   Component Value Date    A1C 6.4 10/06/2020    A1C 6.7 10/11/2018         ASSESSMENT/PLAN:       ICD-10-CM    1. Medicare annual wellness visit, subsequent  Z00.00    2. Type 2 diabetes mellitus without complication, without long-term current use of insulin (H)  E11.9 Comprehensive metabolic panel     Lipid Profile     Albumin Random Urine Quantitative with Creat Ratio     TSH Reflex GH     blood glucose monitoring (NO BRAND SPECIFIED) meter device kit     blood glucose (NO BRAND SPECIFIED) lancing device     blood glucose (NO BRAND SPECIFIED) lancets standard     blood glucose (NO BRAND SPECIFIED) test strip     AMBULATORY ADULT DIABETES EDUCATOR REFERRAL     lisinopril (ZESTRIL) 10 MG tablet     TSH Reflex GH     Lipid Profile     Comprehensive metabolic panel     Albumin Random Urine Quantitative with Creat Ratio   3. Essential  "hypertension  I10 lisinopril (ZESTRIL) 10 MG tablet     DISCONTINUED: hydrochlorothiazide (HYDRODIURIL) 25 MG tablet   4. Hyperlipidemia, unspecified hyperlipidemia type  E78.5 simvastatin (ZOCOR) 10 MG tablet     Lipid Profile     Lipid Profile   5. Visit for screening mammogram  Z12.31 MA Screen Bilateral w/Christopher   6. Needs flu shot  Z23 ZZHC FLU VACCINE, INCREASED ANTIGEN, PRESV FREE     1.  Mammogram ordered as noted.  DEXA declined.  Abdominal aortic aneurysm screening declined.  Last colonoscopy was 10/24/13 and was normal at that time.  Pap Smear deferred due to age >65.  Tdap last given 10/21/19.  Wishes to wait on Shingrix for now.  Other vaccines are up to date.  2.  Stable.  A1c in good range.  Testing supplies prescribed.  Referred to diabetes education given this new diagnosis. Blood pressure is well controlled. Eye exam is due, encouraged her to make an appointment.  On aspirin daily.  Non-smoker.  On a statin.  Follow up in 3 months.  She has not been on an ACE/ARB to date.  Will stop her hydrochlorothiazide and start on lisinopril 10 mg daily instead.  3.  See changes to medications as noted above.  Follow up in 3 months.  4.  Simvastatin refilled.  Lipid profile as above.  5.  Mammogram ordered.  6.  Flu shot updated today.      Patient has been advised of split billing requirements and indicates understanding: Yes  COUNSELING:  Reviewed preventive health counseling, as reflected in patient instructions       Regular exercise       Healthy diet/nutrition       Vision screening       Immunizations    Vaccinated for: Influenza             Aspirin Prophylaxsis       Osteoporosis Prevention/Bone Health       Colon cancer screening       Advance Care Planning    Estimated body mass index is 26.02 kg/m  as calculated from the following:    Height as of this encounter: 1.613 m (5' 3.5\").    Weight as of this encounter: 67.7 kg (149 lb 4 oz).    Weight management plan: Discussed healthy diet and exercise " guidelines    She reports that she has never smoked. She has never used smokeless tobacco.      Counseling Resources:  ATP IV Guidelines  Pooled Cohorts Equation Calculator  Breast Cancer Risk Calculator  BRCA-Related Cancer Risk Assessment: FHS-7 Tool  FRAX Risk Assessment  ICSI Preventive Guidelines  Dietary Guidelines for Americans, 2010  USDA's MyPlate  ASA Prophylaxis  Lung CA Screening    Alexa Mix MD  [facility] AND \[facility] [place] [facility]\""   upper/yes

## 2025-03-17 ENCOUNTER — TELEPHONE (OUTPATIENT)
Dept: FAMILY MEDICINE | Facility: OTHER | Age: 85
End: 2025-03-17
Payer: MEDICARE

## 2025-03-17 DIAGNOSIS — E11.9 TYPE 2 DIABETES MELLITUS WITHOUT COMPLICATION, WITHOUT LONG-TERM CURRENT USE OF INSULIN (H): ICD-10-CM

## 2025-03-17 NOTE — TELEPHONE ENCOUNTER
Form received from [employer], regarding Lancets. Chart accessed to gather information needed to fill out form. [facility] Manisha Berry RN .............. 3/17/2025  1:19 PM

## 2025-07-07 ENCOUNTER — TRANSFERRED RECORDS (OUTPATIENT)
Dept: HEALTH INFORMATION MANAGEMENT | Facility: OTHER | Age: 85
End: 2025-07-07
Payer: MEDICARE

## 2025-07-07 LAB — RETINOPATHY: NEGATIVE

## 2025-08-25 DIAGNOSIS — E78.5 HYPERLIPIDEMIA, UNSPECIFIED HYPERLIPIDEMIA TYPE: ICD-10-CM

## 2025-08-28 RX ORDER — SIMVASTATIN 10 MG
10 TABLET ORAL AT BEDTIME
Qty: 90 TABLET | Refills: 0 | Status: SHIPPED | OUTPATIENT
Start: 2025-08-28